# Patient Record
Sex: MALE | Race: WHITE | NOT HISPANIC OR LATINO | Employment: OTHER | URBAN - METROPOLITAN AREA
[De-identification: names, ages, dates, MRNs, and addresses within clinical notes are randomized per-mention and may not be internally consistent; named-entity substitution may affect disease eponyms.]

---

## 2017-02-02 ENCOUNTER — HISTORICAL (OUTPATIENT)
Dept: ADMINISTRATIVE | Facility: HOSPITAL | Age: 72
End: 2017-02-02

## 2017-02-10 LAB — ISTAT CREATININE: 0.6

## 2017-02-17 LAB
BASOPHILS NFR BLD: 0.1 K/UL (ref 0–0.2)
BASOPHILS NFR BLD: 0.9 %
BUN SERPL-MCNC: 13 MG/DL (ref 8–20)
CALCIUM SERPL-MCNC: 9.5 MG/DL (ref 7.7–10.4)
CHLORIDE: 102 MMOL/L (ref 98–110)
CO2 SERPL-SCNC: 31.8 MMOL/L (ref 22.8–31.6)
CREATININE: 0.82 MG/DL (ref 0.6–1.4)
EOSINOPHIL NFR BLD: 0.1 K/UL (ref 0–0.7)
EOSINOPHIL NFR BLD: 1.4 %
ERYTHROCYTE [DISTWIDTH] IN BLOOD BY AUTOMATED COUNT: 13.6 % (ref 11.7–14.9)
GLUCOSE: 132 MG/DL (ref 70–99)
GRAN #: 8 K/UL (ref 1.4–6.5)
GRAN%: 79.6 %
HCT VFR BLD AUTO: 41.4 % (ref 39–55)
HGB BLD-MCNC: 14.7 G/DL (ref 14–16)
IMMATURE GRANS (ABS): 0 K/UL (ref 0–1)
IMMATURE GRANULOCYTES: 0.4 %
LYMPH #: 1.2 K/UL (ref 1.2–3.4)
LYMPH%: 11.7 %
MCH RBC QN AUTO: 32.7 PG (ref 25–35)
MCHC RBC AUTO-ENTMCNC: 35.5 G/DL (ref 31–36)
MCV RBC AUTO: 92 FL (ref 80–100)
MONO #: 0.6 K/UL (ref 0.1–0.6)
MONO%: 6 %
NUCLEATED RBCS: 0 %
PLATELET # BLD AUTO: 246 K/UL (ref 140–440)
PMV BLD AUTO: 11 FL (ref 8.8–12.7)
POTASSIUM SERPL-SCNC: 4.9 MMOL/L (ref 3.5–5)
RBC # BLD AUTO: 4.5 M/UL (ref 4.3–5.9)
SODIUM: 141 MMOL/L (ref 134–144)
WBC # BLD AUTO: 10 K/UL (ref 5–10)

## 2017-07-25 RX ORDER — AMLODIPINE AND VALSARTAN 5; 320 MG/1; MG/1
TABLET ORAL
Qty: 90 TABLET | Refills: 0 | Status: SHIPPED | OUTPATIENT
Start: 2017-07-25 | End: 2017-10-23 | Stop reason: SDUPTHER

## 2017-08-10 RX ORDER — ATENOLOL 50 MG/1
TABLET ORAL
Qty: 90 TABLET | Refills: 0 | Status: SHIPPED | OUTPATIENT
Start: 2017-08-10 | End: 2017-11-08 | Stop reason: SDUPTHER

## 2017-09-19 RX ORDER — PRAVASTATIN SODIUM 40 MG/1
TABLET ORAL
Qty: 90 TABLET | Refills: 0 | Status: SHIPPED | OUTPATIENT
Start: 2017-09-19 | End: 2017-11-20 | Stop reason: SDUPTHER

## 2017-10-23 RX ORDER — AMLODIPINE AND VALSARTAN 5; 320 MG/1; MG/1
TABLET ORAL
Qty: 90 TABLET | Refills: 0 | Status: SHIPPED | OUTPATIENT
Start: 2017-10-23 | End: 2017-11-20 | Stop reason: SDUPTHER

## 2017-10-25 ENCOUNTER — TELEPHONE (OUTPATIENT)
Dept: FAMILY MEDICINE | Facility: CLINIC | Age: 72
End: 2017-10-25

## 2017-10-25 DIAGNOSIS — E78.5 HYPERLIPIDEMIA, UNSPECIFIED HYPERLIPIDEMIA TYPE: Primary | ICD-10-CM

## 2017-10-25 NOTE — TELEPHONE ENCOUNTER
----- Message from Nasrin Blancas sent at 10/25/2017 10:35 AM CDT -----  Contact: patient  anam for 6 mo check on 11-20. Needs lab orders sent to [facility] by Javi Pace.

## 2017-11-08 RX ORDER — ATENOLOL 50 MG/1
TABLET ORAL
Qty: 90 TABLET | Refills: 0 | Status: SHIPPED | OUTPATIENT
Start: 2017-11-08 | End: 2017-11-20 | Stop reason: SDUPTHER

## 2017-11-13 ENCOUNTER — TELEPHONE (OUTPATIENT)
Dept: FAMILY MEDICINE | Facility: CLINIC | Age: 72
End: 2017-11-13

## 2017-11-13 DIAGNOSIS — E78.5 HYPERLIPIDEMIA, UNSPECIFIED HYPERLIPIDEMIA TYPE: Primary | ICD-10-CM

## 2017-11-16 LAB
ALBUMIN SERPL-MCNC: 4.5 G/DL (ref 3.5–4.8)
ALBUMIN/GLOB SERPL: 2.5 {RATIO} (ref 1.2–2.2)
ALP SERPL-CCNC: 57 IU/L (ref 39–117)
ALT SERPL-CCNC: 13 IU/L (ref 0–44)
AST SERPL-CCNC: 21 IU/L (ref 0–40)
BILIRUB SERPL-MCNC: 0.8 MG/DL (ref 0–1.2)
BUN SERPL-MCNC: 9 MG/DL (ref 8–27)
BUN/CREAT SERPL: 13 (ref 10–24)
CALCIUM SERPL-MCNC: 9.5 MG/DL (ref 8.6–10.2)
CHLORIDE SERPL-SCNC: 105 MMOL/L (ref 96–106)
CHOLEST SERPL-MCNC: 148 MG/DL (ref 100–199)
CO2 SERPL-SCNC: 25 MMOL/L (ref 18–29)
CREAT SERPL-MCNC: 0.67 MG/DL (ref 0.76–1.27)
GFR SERPLBLD CREATININE-BSD FMLA CKD-EPI: 110 ML/MIN/1.73
GFR SERPLBLD CREATININE-BSD FMLA CKD-EPI: 95 ML/MIN/1.73
GLOBULIN SER CALC-MCNC: 1.8 G/DL (ref 1.5–4.5)
GLUCOSE SERPL-MCNC: 117 MG/DL (ref 65–99)
HDLC SERPL-MCNC: 73 MG/DL
LDLC SERPL CALC-MCNC: 61 MG/DL (ref 0–99)
POTASSIUM SERPL-SCNC: 4.9 MMOL/L (ref 3.5–5.2)
PROT SERPL-MCNC: 6.3 G/DL (ref 6–8.5)
SODIUM SERPL-SCNC: 145 MMOL/L (ref 134–144)
TRIGL SERPL-MCNC: 68 MG/DL (ref 0–149)
VLDLC SERPL CALC-MCNC: 14 MG/DL (ref 5–40)

## 2017-11-20 ENCOUNTER — OFFICE VISIT (OUTPATIENT)
Dept: FAMILY MEDICINE | Facility: CLINIC | Age: 72
End: 2017-11-20
Payer: MEDICARE

## 2017-11-20 VITALS
SYSTOLIC BLOOD PRESSURE: 110 MMHG | BODY MASS INDEX: 22.82 KG/M2 | HEART RATE: 64 BPM | OXYGEN SATURATION: 99 % | DIASTOLIC BLOOD PRESSURE: 64 MMHG | HEIGHT: 65 IN | WEIGHT: 137 LBS

## 2017-11-20 DIAGNOSIS — F41.9 CHRONIC ANXIETY: ICD-10-CM

## 2017-11-20 DIAGNOSIS — E78.01 FAMILIAL HYPERCHOLESTEROLEMIA: ICD-10-CM

## 2017-11-20 DIAGNOSIS — I10 ESSENTIAL HYPERTENSION: ICD-10-CM

## 2017-11-20 DIAGNOSIS — R41.3 MEMORY LOSS, LONG TERM: ICD-10-CM

## 2017-11-20 DIAGNOSIS — I34.1 MVP (MITRAL VALVE PROLAPSE): ICD-10-CM

## 2017-11-20 PROBLEM — E78.5 HYPERLIPIDEMIA: Status: ACTIVE | Noted: 2017-11-20

## 2017-11-20 PROCEDURE — 99214 OFFICE O/P EST MOD 30 MIN: CPT | Mod: ,,, | Performed by: FAMILY MEDICINE

## 2017-11-20 RX ORDER — PRAVASTATIN SODIUM 40 MG/1
40 TABLET ORAL DAILY
Qty: 90 TABLET | Refills: 1 | Status: SHIPPED | OUTPATIENT
Start: 2017-11-20 | End: 2018-05-19 | Stop reason: SDUPTHER

## 2017-11-20 RX ORDER — ATENOLOL 50 MG/1
50 TABLET ORAL DAILY
Qty: 90 TABLET | Refills: 1 | Status: SHIPPED | OUTPATIENT
Start: 2017-11-20 | End: 2018-09-18 | Stop reason: SDUPTHER

## 2017-11-20 RX ORDER — ALPRAZOLAM 0.5 MG/1
0.5 TABLET ORAL 3 TIMES DAILY
Qty: 60 TABLET | Refills: 0 | Status: SHIPPED | OUTPATIENT
Start: 2017-11-20 | End: 2020-04-21 | Stop reason: SDUPTHER

## 2017-11-20 RX ORDER — AMLODIPINE AND VALSARTAN 5; 320 MG/1; MG/1
1 TABLET ORAL DAILY
Qty: 90 TABLET | Refills: 1 | Status: SHIPPED | OUTPATIENT
Start: 2017-11-20 | End: 2018-06-20 | Stop reason: SDUPTHER

## 2017-11-20 NOTE — PROGRESS NOTES
Subjective:       Patient ID: Jonathon Pantoja is a 72 y.o. male.    Chief Complaint: Hyperlipidemia (discuss lab results); Hypertension; Anxiety; and Tremors (hands and legs)    Wife with him and noticing progressive loss of longterm memory, resting tremor and muscle weakness. Family history of alzheimer, no parkinsons      Hyperlipidemia   This is a chronic problem. The current episode started more than 1 year ago. The problem is controlled. Recent lipid tests were reviewed and are low. He has no history of chronic renal disease or diabetes. There are no known factors aggravating his hyperlipidemia. Pertinent negatives include no chest pain, myalgias or shortness of breath. Current antihyperlipidemic treatment includes statins. The current treatment provides significant improvement of lipids. There are no compliance problems.    Hypertension   Associated symptoms include anxiety. Pertinent negatives include no chest pain, headaches or shortness of breath. There is no history of chronic renal disease.   Anxiety   Symptoms include nervous/anxious behavior. Patient reports no chest pain, dizziness, nausea, shortness of breath or suicidal ideas.         Review of Systems   Constitutional: Negative for activity change, appetite change and fever.   HENT: Negative for congestion and sore throat.    Eyes: Negative for visual disturbance.   Respiratory: Negative for cough, chest tightness and shortness of breath.    Cardiovascular: Negative for chest pain.   Gastrointestinal: Negative for abdominal pain, constipation, diarrhea and nausea.   Genitourinary: Negative for difficulty urinating.   Musculoskeletal: Negative for back pain and myalgias.   Neurological: Positive for tremors and speech difficulty. Negative for dizziness, facial asymmetry and headaches.   Hematological: Negative for adenopathy.   Psychiatric/Behavioral: Negative for dysphoric mood and suicidal ideas. The patient is nervous/anxious.        Past Medical  "History:   Diagnosis Date    Chronic systolic congestive heart failure     Hyperglycemia     Hyperlipidemia     Hypertension     MVP (mitral valve prolapse)       Past Surgical History:   Procedure Laterality Date    HERNIA REPAIR         History reviewed. No pertinent family history.    Social History     Social History    Marital status:      Spouse name: N/A    Number of children: N/A    Years of education: N/A     Social History Main Topics    Smoking status: Former Smoker    Smokeless tobacco: Never Used    Alcohol use Yes    Drug use: No    Sexual activity: Not Asked     Other Topics Concern    None     Social History Narrative    None       Current Outpatient Prescriptions   Medication Sig Dispense Refill    ALPRAZolam (XANAX) 0.5 MG tablet Take 1 tablet (0.5 mg total) by mouth 3 (three) times daily. 60 tablet 0    amlodipine-valsartan (EXFORGE) 5-320 mg per tablet Take 1 tablet by mouth once daily. 90 tablet 1    atenolol (TENORMIN) 50 MG tablet Take 1 tablet (50 mg total) by mouth once daily. 90 tablet 1    pravastatin (PRAVACHOL) 40 MG tablet Take 1 tablet (40 mg total) by mouth once daily. 90 tablet 1     No current facility-administered medications for this visit.        Review of patient's allergies indicates:  No Known Allergies  Objective:    HPI     Hyperlipidemia    Additional comments: discuss lab results           Tremors    Additional comments: hands and legs       Last edited by Kath Maxwell MA on 11/20/2017  1:18 PM. (History)      Blood pressure 110/64, pulse 64, height 5' 5" (1.651 m), weight 62.1 kg (137 lb), SpO2 99 %. Body mass index is 22.8 kg/m².   Physical Exam   Constitutional: He is oriented to person, place, and time. He appears well-developed and well-nourished.   HENT:   Head: Atraumatic.   Eyes: EOM are normal. Pupils are equal, round, and reactive to light. Right pupil is round. Left pupil is round.   Neck: Normal range of motion. Neck supple. No " thyromegaly present.   Cardiovascular: Normal rate and regular rhythm.    No murmur heard.  Pulmonary/Chest: Effort normal and breath sounds normal. No respiratory distress.   Abdominal: Soft. Bowel sounds are normal. There is no hepatosplenomegaly. There is no tenderness.   Musculoskeletal: Normal range of motion. He exhibits no edema.   Lymphadenopathy:     He has no cervical adenopathy.        Right: No supraclavicular adenopathy present.        Left: No supraclavicular adenopathy present.   Neurological: He is alert and oriented to person, place, and time. He has normal strength. No cranial nerve deficit or sensory deficit. Coordination normal.   Skin: Skin is warm and dry.   Psychiatric: He has a normal mood and affect. His behavior is normal. Judgment and thought content normal. He expresses no suicidal plans.           Assessment:       1. Essential hypertension    2. Familial hypercholesterolemia    3. MVP (mitral valve prolapse)    4. Memory loss, long term    5. Chronic anxiety        Plan:       Jonathon was seen today for hyperlipidemia, hypertension, anxiety and tremors.    Diagnoses and all orders for this visit:    Essential hypertension  -     amlodipine-valsartan (EXFORGE) 5-320 mg per tablet; Take 1 tablet by mouth once daily.    Familial hypercholesterolemia  -     pravastatin (PRAVACHOL) 40 MG tablet; Take 1 tablet (40 mg total) by mouth once daily.    MVP (mitral valve prolapse)  -     atenolol (TENORMIN) 50 MG tablet; Take 1 tablet (50 mg total) by mouth once daily.    Memory loss, long term  -     Ambulatory referral to Neurology    Chronic anxiety  -     ALPRAZolam (XANAX) 0.5 MG tablet; Take 1 tablet (0.5 mg total) by mouth 3 (three) times daily.

## 2018-05-19 DIAGNOSIS — E78.01 FAMILIAL HYPERCHOLESTEROLEMIA: ICD-10-CM

## 2018-05-21 RX ORDER — PRAVASTATIN SODIUM 40 MG/1
TABLET ORAL
Qty: 90 TABLET | Refills: 1 | Status: SHIPPED | OUTPATIENT
Start: 2018-05-21 | End: 2018-09-18 | Stop reason: SDUPTHER

## 2018-06-20 DIAGNOSIS — I10 ESSENTIAL HYPERTENSION: ICD-10-CM

## 2018-06-20 RX ORDER — AMLODIPINE AND VALSARTAN 5; 320 MG/1; MG/1
TABLET ORAL
Qty: 90 TABLET | Refills: 1 | Status: SHIPPED | OUTPATIENT
Start: 2018-06-20 | End: 2018-09-18 | Stop reason: SDUPTHER

## 2018-08-28 ENCOUNTER — TELEPHONE (OUTPATIENT)
Dept: FAMILY MEDICINE | Facility: CLINIC | Age: 73
End: 2018-08-28

## 2018-08-28 DIAGNOSIS — I10 ESSENTIAL HYPERTENSION: ICD-10-CM

## 2018-08-28 DIAGNOSIS — R73.01 IMPAIRED FASTING GLUCOSE: Primary | ICD-10-CM

## 2018-08-28 NOTE — TELEPHONE ENCOUNTER
----- Message from Seven Morales MA sent at 8/28/2018  1:27 PM CDT -----  Regarding: FW: lab orders   Contact: [phone]      ----- Message -----  From: Valeria Santillan  Sent: 8/28/2018  11:51 AM  To: [facility] Staff  Subject: lab orders                                       LAB ORDER REQUEST   YOB: 1945  Appointment Date: 09/18/2018  Appointment Type: 6 month full up htn, cholestrol  Lab: [facility]

## 2018-09-07 LAB
ALBUMIN SERPL-MCNC: 4.7 G/DL (ref 3.5–4.8)
ALBUMIN/GLOB SERPL: 2.6 {RATIO} (ref 1.2–2.2)
ALP SERPL-CCNC: 61 IU/L (ref 39–117)
ALT SERPL-CCNC: 24 IU/L (ref 0–44)
APPEARANCE UR: CLEAR
AST SERPL-CCNC: 21 IU/L (ref 0–40)
BILIRUB SERPL-MCNC: 0.8 MG/DL (ref 0–1.2)
BILIRUB UR QL STRIP: NEGATIVE
BUN SERPL-MCNC: 16 MG/DL (ref 8–27)
BUN/CREAT SERPL: 23 (ref 10–24)
CALCIUM SERPL-MCNC: 9.4 MG/DL (ref 8.6–10.2)
CHLORIDE SERPL-SCNC: 104 MMOL/L (ref 96–106)
CHOLEST SERPL-MCNC: 147 MG/DL (ref 100–199)
CO2 SERPL-SCNC: 24 MMOL/L (ref 20–29)
COLOR UR: YELLOW
CREAT SERPL-MCNC: 0.69 MG/DL (ref 0.76–1.27)
EGFR IF AFRICAN AMERICAN: 109 ML/MIN/1.73
EST. GFR  (NON AFRICAN AMERICAN): 94 ML/MIN/1.73
GLOBULIN SER CALC-MCNC: 1.8 G/DL (ref 1.5–4.5)
GLUCOSE SERPL-MCNC: 108 MG/DL (ref 65–99)
GLUCOSE UR QL: NEGATIVE
HBA1C MFR BLD: 4.9 % (ref 4.8–5.6)
HDLC SERPL-MCNC: 80 MG/DL
HGB UR QL STRIP: NEGATIVE
KETONES UR QL STRIP: NEGATIVE
LDLC SERPL CALC-MCNC: 56 MG/DL (ref 0–99)
LEUKOCYTE ESTERASE UR QL STRIP: NEGATIVE
MICRO URNS: NORMAL
NITRITE UR QL STRIP: NEGATIVE
PH UR STRIP: 5.5 [PH] (ref 5–7.5)
POTASSIUM SERPL-SCNC: 4.3 MMOL/L (ref 3.5–5.2)
PROT SERPL-MCNC: 6.5 G/DL (ref 6–8.5)
PROT UR QL STRIP: NEGATIVE
SODIUM SERPL-SCNC: 145 MMOL/L (ref 134–144)
SP GR UR: 1.02 (ref 1–1.03)
TRIGL SERPL-MCNC: 53 MG/DL (ref 0–149)
UROBILINOGEN UR STRIP-MCNC: 0.2 MG/DL (ref 0.2–1)
VLDLC SERPL CALC-MCNC: 11 MG/DL (ref 5–40)

## 2018-09-18 ENCOUNTER — OFFICE VISIT (OUTPATIENT)
Dept: FAMILY MEDICINE | Facility: CLINIC | Age: 73
End: 2018-09-18
Payer: MEDICARE

## 2018-09-18 VITALS
TEMPERATURE: 98 F | HEIGHT: 65 IN | HEART RATE: 62 BPM | OXYGEN SATURATION: 96 % | DIASTOLIC BLOOD PRESSURE: 68 MMHG | BODY MASS INDEX: 20.99 KG/M2 | SYSTOLIC BLOOD PRESSURE: 110 MMHG | WEIGHT: 126 LBS

## 2018-09-18 DIAGNOSIS — R06.09 DYSPNEA ON EXERTION: ICD-10-CM

## 2018-09-18 DIAGNOSIS — I10 ESSENTIAL HYPERTENSION: Primary | ICD-10-CM

## 2018-09-18 DIAGNOSIS — R35.1 BENIGN PROSTATIC HYPERPLASIA WITH NOCTURIA: ICD-10-CM

## 2018-09-18 DIAGNOSIS — R63.4 WEIGHT LOSS, UNINTENTIONAL: ICD-10-CM

## 2018-09-18 DIAGNOSIS — R41.3 MEMORY LOSS, LONG TERM: ICD-10-CM

## 2018-09-18 DIAGNOSIS — I34.1 MVP (MITRAL VALVE PROLAPSE): ICD-10-CM

## 2018-09-18 DIAGNOSIS — N40.1 BENIGN PROSTATIC HYPERPLASIA WITH NOCTURIA: ICD-10-CM

## 2018-09-18 DIAGNOSIS — E78.01 FAMILIAL HYPERCHOLESTEROLEMIA: ICD-10-CM

## 2018-09-18 PROBLEM — I50.22 CHRONIC SYSTOLIC HEART FAILURE: Status: ACTIVE | Noted: 2018-09-18

## 2018-09-18 PROBLEM — K40.20 BILATERAL INGUINAL HERNIA WITHOUT OBSTRUCTION OR GANGRENE: Status: RESOLVED | Noted: 2018-09-18 | Resolved: 2018-09-18

## 2018-09-18 PROBLEM — K40.20 BILATERAL INGUINAL HERNIA WITHOUT OBSTRUCTION OR GANGRENE: Status: ACTIVE | Noted: 2018-09-18

## 2018-09-18 PROBLEM — R73.9 HYPERGLYCEMIA: Status: ACTIVE | Noted: 2018-09-18

## 2018-09-18 PROCEDURE — 99214 OFFICE O/P EST MOD 30 MIN: CPT | Mod: ,,, | Performed by: INTERNAL MEDICINE

## 2018-09-18 RX ORDER — PRAVASTATIN SODIUM 40 MG/1
40 TABLET ORAL DAILY
Qty: 90 TABLET | Refills: 1 | Status: SHIPPED | OUTPATIENT
Start: 2018-09-18 | End: 2019-01-29 | Stop reason: SDUPTHER

## 2018-09-18 RX ORDER — DONEPEZIL HYDROCHLORIDE 10 MG/1
1 TABLET, FILM COATED ORAL DAILY
COMMUNITY
Start: 2018-09-10

## 2018-09-18 RX ORDER — MULTIVITAMIN
1 TABLET ORAL DAILY
COMMUNITY

## 2018-09-18 RX ORDER — CARBIDOPA AND LEVODOPA 25; 250 MG/1; MG/1
1 TABLET ORAL 4 TIMES DAILY
COMMUNITY
Start: 2018-08-07

## 2018-09-18 RX ORDER — ATENOLOL 50 MG/1
50 TABLET ORAL DAILY
Qty: 90 TABLET | Refills: 1 | Status: SHIPPED | OUTPATIENT
Start: 2018-09-18 | End: 2019-01-29 | Stop reason: SDUPTHER

## 2018-09-18 RX ORDER — ASPIRIN 81 MG/1
1 TABLET ORAL DAILY
COMMUNITY

## 2018-09-18 RX ORDER — AMLODIPINE AND VALSARTAN 5; 320 MG/1; MG/1
1 TABLET ORAL DAILY
Qty: 90 TABLET | Refills: 1 | Status: SHIPPED | OUTPATIENT
Start: 2018-09-18 | End: 2018-12-18 | Stop reason: HOSPADM

## 2018-09-18 NOTE — PROGRESS NOTES
Subjective:       Patient ID: Jonathon Pantoja is a 72 y.o. male.    Chief Complaint: Hypertension (continuity of care and med refills) and Hyperlipidemia    Here to establish care with me; previously seeing Dr. Walters who is cutting back on his schedule.   Reports a h/o dementia which they feel has actually improved some on medications; followed by Neuro      Hypertension   This is a chronic problem. The problem is controlled. Associated symptoms include shortness of breath (especially when out in the heat). Pertinent negatives include no chest pain, headaches, neck pain, orthopnea, palpitations or peripheral edema. Past treatments include beta blockers, calcium channel blockers and angiotensin blockers.     Review of Systems   Constitutional: Positive for chills and unexpected weight change (weight loss of 9 pounds since last visit and 30+ pounds in last couple of years). Negative for fatigue and fever.   HENT: Positive for rhinorrhea. Negative for congestion, hearing loss, postnasal drip, trouble swallowing and voice change.    Eyes: Negative for photophobia and visual disturbance.   Respiratory: Positive for shortness of breath (especially when out in the heat). Negative for apnea, cough, choking, chest tightness and wheezing.    Cardiovascular: Negative for chest pain, palpitations, orthopnea and leg swelling.   Gastrointestinal: Positive for constipation. Negative for abdominal pain, blood in stool, diarrhea, nausea, rectal pain and vomiting.   Endocrine: Positive for cold intolerance. Negative for heat intolerance, polydipsia and polyuria.   Genitourinary: Negative for decreased urine volume, difficulty urinating, discharge, dysuria, flank pain, frequency, genital sores, hematuria, testicular pain and urgency.        Urine incontinence   Musculoskeletal: Negative for arthralgias, back pain, gait problem, joint swelling, myalgias and neck pain.   Skin: Positive for color change. Negative for rash and wound.    Allergic/Immunologic: Negative for environmental allergies and food allergies.   Neurological: Positive for tremors and speech difficulty. Negative for dizziness, seizures, syncope, facial asymmetry, weakness, light-headedness, numbness and headaches.   Hematological: Negative for adenopathy. Bruises/bleeds easily.   Psychiatric/Behavioral: Positive for confusion. Negative for hallucinations, sleep disturbance and suicidal ideas. The patient is nervous/anxious.        Past Medical History:   Diagnosis Date    Chronic systolic congestive heart failure     Hyperglycemia     Hyperlipidemia     Hypertension     MVP (mitral valve prolapse)       Past Surgical History:   Procedure Laterality Date    HERNIA REPAIR Bilateral        Family History   Problem Relation Age of Onset    Alzheimer's disease Mother     No Known Problems Father        Social History     Socioeconomic History    Marital status:      Spouse name: None    Number of children: None    Years of education: None    Highest education level: None   Social Needs    Financial resource strain: None    Food insecurity - worry: None    Food insecurity - inability: None    Transportation needs - medical: None    Transportation needs - non-medical: None   Occupational History    Occupation: retired   Tobacco Use    Smoking status: Former Smoker    Smokeless tobacco: Never Used   Substance and Sexual Activity    Alcohol use: Yes     Comment: occasional    Drug use: No    Sexual activity: No   Other Topics Concern    None   Social History Narrative    Live with wife       Current Outpatient Medications   Medication Sig Dispense Refill    amlodipine-valsartan (EXFORGE) 5-320 mg per tablet Take 1 tablet by mouth once daily. 90 tablet 1    aspirin (ECOTRIN) 81 MG EC tablet Take 1 tablet by mouth once daily.      atenolol (TENORMIN) 50 MG tablet Take 1 tablet (50 mg total) by mouth once daily. 90 tablet 1    carbidopa-levodopa   "mg (SINEMET)  mg per tablet Take 1 tablet by mouth 3 (three) times daily.      donepezil (ARICEPT) 10 MG tablet Take 1 tablet by mouth once daily.      multivitamin (THERAGRAN) per tablet Take 1 tablet by mouth once daily.      pravastatin (PRAVACHOL) 40 MG tablet Take 1 tablet (40 mg total) by mouth once daily. 90 tablet 1    ALPRAZolam (XANAX) 0.5 MG tablet Take 1 tablet (0.5 mg total) by mouth 3 (three) times daily. 60 tablet 0     No current facility-administered medications for this visit.        Review of patient's allergies indicates:  No Known Allergies  Objective:    HPI     Hypertension      Additional comments: continuity of care and med refills          Last edited by Seven Morales MA on 9/18/2018 11:19 AM. (History)      Blood pressure 110/68, pulse 62, temperature 97.8 °F (36.6 °C), temperature source Temporal, height 5' 5" (1.651 m), weight 57.2 kg (126 lb), SpO2 96 %. Body mass index is 20.97 kg/m².   Physical Exam      Telephone on 08/28/2018   Component Date Value Ref Range Status    Specific Gravity, UA 09/06/2018 1.023  1.005 - 1.030 Final    pH, UA 09/06/2018 5.5  5.0 - 7.5 Final    Color, UA 09/06/2018 Yellow  Yellow Final    Clarity, UA 09/06/2018 Clear  Clear Final    Leukocytes, UA 09/06/2018 Negative  Negative Final    Protein, UA 09/06/2018 Negative  Negative/Trace Final    Glucose, UA 09/06/2018 Negative  Negative Final    Ketones, UA 09/06/2018 Negative  Negative Final    Occult Blood UA 09/06/2018 Negative  Negative Final    Bilirubin, UA 09/06/2018 Negative  Negative Final    Urobilinogen, UA 09/06/2018 0.2  0.2 - 1.0 mg/dL Final    Nitrite, UA 09/06/2018 Negative  Negative Final    Microscopic Examination 09/06/2018 Comment   Final    Microscopic not indicated and not performed.    Cholesterol 09/06/2018 147  100 - 199 mg/dL Final    Triglycerides 09/06/2018 53  0 - 149 mg/dL Final    HDL 09/06/2018 80  >39 mg/dL Final    VLDL Cholesterol Noe 09/06/2018 11 "  5 - 40 mg/dL Final    LDL Calculated 09/06/2018 56  0 - 99 mg/dL Final    Glucose 09/06/2018 108* 65 - 99 mg/dL Final    BUN, Bld 09/06/2018 16  8 - 27 mg/dL Final    Creatinine 09/06/2018 0.69* 0.76 - 1.27 mg/dL Final    eGFR if non African American 09/06/2018 94  >59 mL/min/1.73 Final    eGFR if African American 09/06/2018 109  >59 mL/min/1.73 Final    BUN/Creatinine Ratio 09/06/2018 23  10 - 24 Final    Sodium 09/06/2018 145* 134 - 144 mmol/L Final    Potassium 09/06/2018 4.3  3.5 - 5.2 mmol/L Final    Chloride 09/06/2018 104  96 - 106 mmol/L Final    CO2 09/06/2018 24  20 - 29 mmol/L Final    Calcium 09/06/2018 9.4  8.6 - 10.2 mg/dL Final    Total Protein 09/06/2018 6.5  6.0 - 8.5 g/dL Final    Albumin 09/06/2018 4.7  3.5 - 4.8 g/dL Final    Globulin, Total 09/06/2018 1.8  1.5 - 4.5 g/dL Final    Albumin/Globulin Ratio 09/06/2018 2.6* 1.2 - 2.2 Final    Total Bilirubin 09/06/2018 0.8  0.0 - 1.2 mg/dL Final    Alkaline Phosphatase 09/06/2018 61  39 - 117 IU/L Final    AST 09/06/2018 21  0 - 40 IU/L Final    ALT 09/06/2018 24  0 - 44 IU/L Final    Hemoglobin A1C 09/06/2018 4.9  4.8 - 5.6 % Final    Comment:          Prediabetes: 5.7 - 6.4           Diabetes: >6.4           Glycemic control for adults with diabetes: <7.0     ]  Assessment:       1. Essential hypertension    2. Dyspnea on exertion    3. Familial hypercholesterolemia    4. Weight loss, unintentional    5. MVP (mitral valve prolapse)    6. Memory loss, long term    7. Benign prostatic hyperplasia with nocturia         Plan:       Jonathon was seen today for hypertension and hyperlipidemia.    Diagnoses and all orders for this visit:    Essential hypertension  -     amlodipine-valsartan (EXFORGE) 5-320 mg per tablet; Take 1 tablet by mouth once daily.    Dyspnea on exertion  -     CBC auto differential; Future  -     X-Ray Chest PA And Lateral; Future  -     Brain natriuretic peptide; Future  -     CBC auto differential  -     Brain  natriuretic peptide    Familial hypercholesterolemia  -     pravastatin (PRAVACHOL) 40 MG tablet; Take 1 tablet (40 mg total) by mouth once daily.    Weight loss, unintentional  Comments:  May be r/t less intake d/t dementia but needs evaluation.   Orders:  -     TSH; Future  -     CBC auto differential; Future  -     Prostate Specific Antigen, Diagnostic; Future  -     X-Ray Chest PA And Lateral; Future  -     TSH  -     CBC auto differential  -     Prostate Specific Antigen, Diagnostic    MVP (mitral valve prolapse)  -     atenolol (TENORMIN) 50 MG tablet; Take 1 tablet (50 mg total) by mouth once daily.    Memory loss, long term  Comments:  Followed by Neuro      Benign prostatic hyperplasia with nocturia   -     Prostate Specific Antigen, Diagnostic; Future  -     Prostate Specific Antigen, Diagnostic

## 2018-09-20 LAB
BASOPHILS # BLD AUTO: 0.1 X10E3/UL (ref 0–0.2)
BASOPHILS NFR BLD AUTO: 1 %
BNP SERPL-MCNC: 47.7 PG/ML (ref 0–100)
EOSINOPHIL # BLD AUTO: 0.1 X10E3/UL (ref 0–0.4)
EOSINOPHIL NFR BLD AUTO: 2 %
ERYTHROCYTE [DISTWIDTH] IN BLOOD BY AUTOMATED COUNT: 14.5 % (ref 12.3–15.4)
HCT VFR BLD AUTO: 41.9 % (ref 37.5–51)
HGB BLD-MCNC: 14 G/DL (ref 13–17.7)
IMM GRANULOCYTES # BLD: 0 X10E3/UL (ref 0–0.1)
IMM GRANULOCYTES NFR BLD: 0 %
LYMPHOCYTES # BLD AUTO: 1.2 X10E3/UL (ref 0.7–3.1)
LYMPHOCYTES NFR BLD AUTO: 21 %
MCH RBC QN AUTO: 31.7 PG (ref 26.6–33)
MCHC RBC AUTO-ENTMCNC: 33.4 G/DL (ref 31.5–35.7)
MCV RBC AUTO: 95 FL (ref 79–97)
MONOCYTES # BLD AUTO: 0.4 X10E3/UL (ref 0.1–0.9)
MONOCYTES NFR BLD AUTO: 8 %
NEUTROPHILS # BLD AUTO: 3.9 X10E3/UL (ref 1.4–7)
NEUTROPHILS NFR BLD AUTO: 68 %
PLATELET # BLD AUTO: 219 X10E3/UL (ref 150–379)
PSA SERPL-MCNC: 1.9 NG/ML (ref 0–4)
RBC # BLD AUTO: 4.41 X10E6/UL (ref 4.14–5.8)
TSH SERPL DL<=0.005 MIU/L-ACNC: 2.14 UIU/ML (ref 0.45–4.5)
WBC # BLD AUTO: 5.7 X10E3/UL (ref 3.4–10.8)

## 2018-12-18 ENCOUNTER — OFFICE VISIT (OUTPATIENT)
Dept: FAMILY MEDICINE | Facility: CLINIC | Age: 73
End: 2018-12-18
Payer: MEDICARE

## 2018-12-18 VITALS
HEART RATE: 65 BPM | OXYGEN SATURATION: 98 % | HEIGHT: 65 IN | TEMPERATURE: 98 F | SYSTOLIC BLOOD PRESSURE: 100 MMHG | BODY MASS INDEX: 21.16 KG/M2 | WEIGHT: 127 LBS | DIASTOLIC BLOOD PRESSURE: 60 MMHG

## 2018-12-18 DIAGNOSIS — Z23 NEED FOR PROPHYLACTIC VACCINATION AGAINST STREPTOCOCCUS PNEUMONIAE (PNEUMOCOCCUS): ICD-10-CM

## 2018-12-18 DIAGNOSIS — I10 ESSENTIAL HYPERTENSION: Primary | ICD-10-CM

## 2018-12-18 PROBLEM — G20.A1 PARKINSON'S DISEASE: Status: ACTIVE | Noted: 2018-05-01

## 2018-12-18 PROBLEM — F41.9 ANXIETY DISORDER: Status: ACTIVE | Noted: 2017-11-29

## 2018-12-18 PROCEDURE — 90732 PPSV23 VACC 2 YRS+ SUBQ/IM: CPT | Mod: ,,, | Performed by: INTERNAL MEDICINE

## 2018-12-18 PROCEDURE — G0009 ADMIN PNEUMOCOCCAL VACCINE: HCPCS | Mod: ,,, | Performed by: INTERNAL MEDICINE

## 2018-12-18 PROCEDURE — 99213 OFFICE O/P EST LOW 20 MIN: CPT | Mod: 25,,, | Performed by: INTERNAL MEDICINE

## 2018-12-18 RX ORDER — IRBESARTAN 300 MG/1
300 TABLET ORAL NIGHTLY
Qty: 90 TABLET | Refills: 1 | Status: SHIPPED | OUTPATIENT
Start: 2018-12-18 | End: 2019-03-12 | Stop reason: SDUPTHER

## 2018-12-18 NOTE — PROGRESS NOTES
Subjective:       Patient ID: Jonathon Pantoja is a 73 y.o. male.    Chief Complaint: Hypertension (continuity of care) and Anxiety    Here for routine follow up and med refills.  His wife reports that he has been eating better and his weight seems to have stabilized.  Memory seems to be stable also.        Hypertension   This is a chronic problem. The problem is controlled. Associated symptoms include shortness of breath. Pertinent negatives include no chest pain, headaches, neck pain, palpitations or peripheral edema. Past treatments include calcium channel blockers, angiotensin blockers and beta blockers. There are no compliance problems (recently got notice of recall on exforge but has continued to take it).      Review of Systems   Constitutional: Positive for fatigue. Negative for chills, fever and unexpected weight change (weight stabilized).   HENT: Positive for rhinorrhea. Negative for congestion, hearing loss, postnasal drip, trouble swallowing and voice change.    Eyes: Negative for photophobia and visual disturbance.   Respiratory: Positive for shortness of breath. Negative for apnea, cough, choking, chest tightness and wheezing.    Cardiovascular: Negative for chest pain, palpitations and leg swelling.   Gastrointestinal: Positive for constipation. Negative for abdominal pain, blood in stool, diarrhea, nausea, rectal pain and vomiting.   Endocrine: Negative for cold intolerance, heat intolerance, polydipsia and polyuria.   Genitourinary: Negative for decreased urine volume, difficulty urinating, discharge, dysuria, flank pain, frequency, genital sores, hematuria, testicular pain and urgency.   Musculoskeletal: Negative for arthralgias, back pain, gait problem, joint swelling, myalgias and neck pain.   Skin: Negative for color change, rash and wound.   Allergic/Immunologic: Negative for environmental allergies and food allergies.   Neurological: Positive for tremors, speech difficulty, weakness and  light-headedness (sometimes when stands quickly). Negative for dizziness, seizures, syncope, facial asymmetry, numbness and headaches.   Hematological: Negative for adenopathy. Bruises/bleeds easily.   Psychiatric/Behavioral: Positive for confusion and sleep disturbance. Negative for hallucinations and suicidal ideas (nightmares). The patient is nervous/anxious.        Past Medical History:   Diagnosis Date    Chronic systolic congestive heart failure     Hyperglycemia     Hyperlipidemia     Hypertension     MVP (mitral valve prolapse)       Past Surgical History:   Procedure Laterality Date    HERNIA REPAIR Bilateral        Family History   Problem Relation Age of Onset    Alzheimer's disease Mother     No Known Problems Father        Social History     Socioeconomic History    Marital status:      Spouse name: None    Number of children: None    Years of education: None    Highest education level: None   Social Needs    Financial resource strain: None    Food insecurity - worry: None    Food insecurity - inability: None    Transportation needs - medical: None    Transportation needs - non-medical: None   Occupational History    Occupation: retired   Tobacco Use    Smoking status: Former Smoker    Smokeless tobacco: Never Used   Substance and Sexual Activity    Alcohol use: Yes     Comment: occasional    Drug use: No    Sexual activity: No   Other Topics Concern    None   Social History Narrative    Live with wife       Current Outpatient Medications   Medication Sig Dispense Refill    aspirin (ECOTRIN) 81 MG EC tablet Take 1 tablet by mouth once daily.      atenolol (TENORMIN) 50 MG tablet Take 1 tablet (50 mg total) by mouth once daily. 90 tablet 1    carbidopa-levodopa  mg (SINEMET)  mg per tablet Take 1 tablet by mouth 4 (four) times daily.       donepezil (ARICEPT) 10 MG tablet Take 1 tablet by mouth once daily.      multivitamin (THERAGRAN) per tablet Take 1  "tablet by mouth once daily.      pravastatin (PRAVACHOL) 40 MG tablet Take 1 tablet (40 mg total) by mouth once daily. 90 tablet 1    ALPRAZolam (XANAX) 0.5 MG tablet Take 1 tablet (0.5 mg total) by mouth 3 (three) times daily. 60 tablet 0    irbesartan (AVAPRO) 300 MG tablet Take 1 tablet (300 mg total) by mouth every evening. 90 tablet 1     No current facility-administered medications for this visit.        Review of patient's allergies indicates:  No Known Allergies  Objective:    HPI     Hypertension      Additional comments: continuity of care          Last edited by Seven Morales MA on 12/18/2018  1:02 PM. (History)      Blood pressure 100/60, pulse 65, temperature 98 °F (36.7 °C), temperature source Temporal, height 5' 5" (1.651 m), weight 57.6 kg (127 lb), SpO2 98 %. Body mass index is 21.13 kg/m².   Physical Exam   Constitutional: He appears well-developed. No distress.   HENT:   Nose: Nose normal.   Mouth/Throat: Oropharynx is clear and moist.   Eyes: Conjunctivae are normal. Right eye exhibits no discharge. Left eye exhibits no discharge. No scleral icterus.   Neck: Carotid bruit is not present.   Cardiovascular: Normal rate, regular rhythm and normal heart sounds.   No murmur heard.  Pulmonary/Chest: Effort normal and breath sounds normal. No respiratory distress. He has no decreased breath sounds. He has no wheezes. He has no rhonchi. He has no rales.   Abdominal: Soft. He exhibits no distension. There is no tenderness. There is no rebound and no guarding.   Musculoskeletal: He exhibits no edema.   Neurological: He is alert. He displays no tremor.   Skin: Skin is warm and dry.   Psychiatric: He has a normal mood and affect. His speech is normal.   Nursing note and vitals reviewed.        No visits with results within 3 Month(s) from this visit.   Latest known visit with results is:   Office Visit on 09/18/2018   Component Date Value Ref Range Status    TSH 09/19/2018 2.140  0.450 - 4.500 uIU/mL " Final    WBC 09/19/2018 5.7  3.4 - 10.8 x10E3/uL Final    RBC 09/19/2018 4.41  4.14 - 5.80 x10E6/uL Final    Hemoglobin 09/19/2018 14.0  13.0 - 17.7 g/dL Final    Hematocrit 09/19/2018 41.9  37.5 - 51.0 % Final    MCV 09/19/2018 95  79 - 97 fL Final    MCH 09/19/2018 31.7  26.6 - 33.0 pg Final    MCHC 09/19/2018 33.4  31.5 - 35.7 g/dL Final    RDW 09/19/2018 14.5  12.3 - 15.4 % Final    Platelets 09/19/2018 219  150 - 379 x10E3/uL Final    Neutrophils 09/19/2018 68  Not Estab. % Final    Lymph% 09/19/2018 21  Not Estab. % Final    Mono% 09/19/2018 8  Not Estab. % Final    Eosinophil% 09/19/2018 2  Not Estab. % Final    Basophil% 09/19/2018 1  Not Estab. % Final    Neutrophils Absolute 09/19/2018 3.9  1.4 - 7.0 x10E3/uL Final    Lymph # 09/19/2018 1.2  0.7 - 3.1 x10E3/uL Final    Mono # 09/19/2018 0.4  0.1 - 0.9 x10E3/uL Final    Eos # 09/19/2018 0.1  0.0 - 0.4 x10E3/uL Final    Baso # 09/19/2018 0.1  0.0 - 0.2 x10E3/uL Final    Immature Granulocytes 09/19/2018 0  Not Estab. % Final    Immature Grans (Abs) 09/19/2018 0.0  0.0 - 0.1 x10E3/uL Final    BNP 09/19/2018 47.7  0.0 - 100.0 pg/mL Final    PSA - [facility] 09/19/2018 1.9  0.0 - 4.0 ng/mL Final    Comment: Roche ECLIA methodology.  According to the [employer] Association, Serum PSA should  decrease and remain at undetectable levels after radical  prostatectomy. The AUA defines biochemical recurrence as an initial  PSA value 0.2 ng/mL or greater followed by a subsequent confirmatory  PSA value 0.2 ng/mL or greater.  Values obtained with different assay methods or kits cannot be used  interchangeably. Results cannot be interpreted as absolute evidence  of the presence or absence of malignant disease.     ]  Assessment:       1. Essential hypertension    2. Need for prophylactic vaccination against Streptococcus pneumoniae (pneumococcus)        Plan:       Jonathon was seen today for hypertension and anxiety.    Diagnoses and all orders  for this visit:    Essential hypertension  Comments:  Will switch to a different ARB and drop the amlodipine for now.  BP in the 120s/70s may improve fatigue and occasional lightheadedness.  Orders:  -     irbesartan (AVAPRO) 300 MG tablet; Take 1 tablet (300 mg total) by mouth every evening.    Need for prophylactic vaccination against Streptococcus pneumoniae (pneumococcus)  -     Pneumococcal Polysaccharide Vaccine (23 Valent) (SQ/IM)

## 2019-01-29 ENCOUNTER — OFFICE VISIT (OUTPATIENT)
Dept: FAMILY MEDICINE | Facility: CLINIC | Age: 74
End: 2019-01-29
Payer: MEDICARE

## 2019-01-29 VITALS
SYSTOLIC BLOOD PRESSURE: 120 MMHG | OXYGEN SATURATION: 98 % | BODY MASS INDEX: 21.83 KG/M2 | HEIGHT: 65 IN | WEIGHT: 131 LBS | HEART RATE: 61 BPM | TEMPERATURE: 99 F | DIASTOLIC BLOOD PRESSURE: 66 MMHG

## 2019-01-29 DIAGNOSIS — I10 ESSENTIAL HYPERTENSION: Primary | ICD-10-CM

## 2019-01-29 DIAGNOSIS — E78.01 FAMILIAL HYPERCHOLESTEROLEMIA: ICD-10-CM

## 2019-01-29 DIAGNOSIS — I34.1 MVP (MITRAL VALVE PROLAPSE): ICD-10-CM

## 2019-01-29 DIAGNOSIS — Z23 NEED FOR PROPHYLACTIC VACCINATION AND INOCULATION AGAINST INFLUENZA: ICD-10-CM

## 2019-01-29 PROCEDURE — 99212 OFFICE O/P EST SF 10 MIN: CPT | Mod: 25,,, | Performed by: INTERNAL MEDICINE

## 2019-01-29 PROCEDURE — 99212 PR OFFICE/OUTPT VISIT, EST, LEVL II, 10-19 MIN: ICD-10-PCS | Mod: 25,,, | Performed by: INTERNAL MEDICINE

## 2019-01-29 PROCEDURE — 90662 IIV NO PRSV INCREASED AG IM: CPT | Mod: ,,, | Performed by: INTERNAL MEDICINE

## 2019-01-29 PROCEDURE — G0008 FLU VACCINE - HIGH DOSE (65+) PRESERVATIVE FREE IM: ICD-10-PCS | Mod: ,,, | Performed by: INTERNAL MEDICINE

## 2019-01-29 PROCEDURE — G0008 ADMIN INFLUENZA VIRUS VAC: HCPCS | Mod: ,,, | Performed by: INTERNAL MEDICINE

## 2019-01-29 PROCEDURE — 90662 FLU VACCINE - HIGH DOSE (65+) PRESERVATIVE FREE IM: ICD-10-PCS | Mod: ,,, | Performed by: INTERNAL MEDICINE

## 2019-01-29 RX ORDER — ATENOLOL 50 MG/1
50 TABLET ORAL DAILY
Qty: 90 TABLET | Refills: 1 | Status: SHIPPED | OUTPATIENT
Start: 2019-01-29 | End: 2019-04-29 | Stop reason: SDUPTHER

## 2019-01-29 RX ORDER — PRAVASTATIN SODIUM 40 MG/1
40 TABLET ORAL DAILY
Qty: 90 TABLET | Refills: 1 | Status: SHIPPED | OUTPATIENT
Start: 2019-01-29 | End: 2019-07-05 | Stop reason: SDUPTHER

## 2019-01-29 NOTE — PROGRESS NOTES
Subjective:       Patient ID: Jonathon Pantoja is a 73 y.o. male.    Chief Complaint: Hypertension (continuity of care )    Here for follow up on changes to blood pressure medication.  He has noticed less lightheadedness and a little more energy.  Still gets lightheaded at times if he stands too fast when getting out of bed in the morning.  Overall, he is feeling better.      Review of Systems   Constitutional: Negative for chills, fatigue, fever and unexpected weight change.   HENT: Positive for congestion and postnasal drip. Negative for hearing loss, rhinorrhea, trouble swallowing and voice change.    Eyes: Negative for photophobia and visual disturbance.   Respiratory: Positive for cough. Negative for apnea, choking, chest tightness, shortness of breath and wheezing.    Cardiovascular: Negative for chest pain, palpitations and leg swelling.   Gastrointestinal: Negative for abdominal pain, blood in stool, constipation, diarrhea, nausea, rectal pain and vomiting.   Endocrine: Negative for cold intolerance, heat intolerance, polydipsia and polyuria.   Genitourinary: Negative for decreased urine volume, difficulty urinating, discharge, dysuria, flank pain, frequency, genital sores, hematuria, testicular pain and urgency.   Musculoskeletal: Negative for arthralgias, back pain, gait problem, joint swelling, myalgias and neck pain.   Skin: Negative for color change, rash and wound.   Allergic/Immunologic: Negative for environmental allergies and food allergies.   Neurological: Positive for speech difficulty. Negative for dizziness, tremors, seizures, syncope, facial asymmetry, weakness, light-headedness, numbness and headaches.   Hematological: Negative for adenopathy. Bruises/bleeds easily.   Psychiatric/Behavioral: Negative for confusion, hallucinations, sleep disturbance and suicidal ideas. The patient is nervous/anxious.        Past Medical History:   Diagnosis Date    Chronic systolic congestive heart failure      Hyperglycemia     Hyperlipidemia     Hypertension     MVP (mitral valve prolapse)       Past Surgical History:   Procedure Laterality Date    HERNIA REPAIR Bilateral        Family History   Problem Relation Age of Onset    Alzheimer's disease Mother     No Known Problems Father        Social History     Socioeconomic History    Marital status:      Spouse name: None    Number of children: None    Years of education: None    Highest education level: None   Social Needs    Financial resource strain: None    Food insecurity - worry: None    Food insecurity - inability: None    Transportation needs - medical: None    Transportation needs - non-medical: None   Occupational History    Occupation: retired   Tobacco Use    Smoking status: Former Smoker    Smokeless tobacco: Never Used   Substance and Sexual Activity    Alcohol use: Yes     Comment: occasional    Drug use: No    Sexual activity: No   Other Topics Concern    None   Social History Narrative    Live with wife       Current Outpatient Medications   Medication Sig Dispense Refill    aspirin (ECOTRIN) 81 MG EC tablet Take 1 tablet by mouth once daily.      atenolol (TENORMIN) 50 MG tablet Take 1 tablet (50 mg total) by mouth once daily. 90 tablet 1    carbidopa-levodopa  mg (SINEMET)  mg per tablet Take 1 tablet by mouth 4 (four) times daily.       donepezil (ARICEPT) 10 MG tablet Take 1 tablet by mouth once daily.      irbesartan (AVAPRO) 300 MG tablet Take 1 tablet (300 mg total) by mouth every evening. 90 tablet 1    multivitamin (THERAGRAN) per tablet Take 1 tablet by mouth once daily.      pravastatin (PRAVACHOL) 40 MG tablet Take 1 tablet (40 mg total) by mouth once daily. 90 tablet 1    ALPRAZolam (XANAX) 0.5 MG tablet Take 1 tablet (0.5 mg total) by mouth 3 (three) times daily. 60 tablet 0     No current facility-administered medications for this visit.        Review of patient's allergies indicates:  No  "Known Allergies  Objective:    HPI     Hypertension      Additional comments: continuity of care           Last edited by Seven Morales MA on 1/29/2019  1:04 PM. (History)      Blood pressure 120/66, pulse 61, temperature 98.7 °F (37.1 °C), temperature source Temporal, height 5' 5" (1.651 m), weight 59.4 kg (131 lb), SpO2 98 %. Body mass index is 21.8 kg/m².   Physical Exam   Constitutional: He appears well-developed and well-nourished.   Cardiovascular: Normal rate, regular rhythm and normal heart sounds.   Pulmonary/Chest: Effort normal and breath sounds normal.   Nursing note and vitals reviewed.          Assessment:       1. Essential hypertension    2. MVP (mitral valve prolapse)    3. Familial hypercholesterolemia    4. Need for prophylactic vaccination and inoculation against influenza        Plan:       Jonathon was seen today for hypertension.    Diagnoses and all orders for this visit:    Essential hypertension  -     atenolol (TENORMIN) 50 MG tablet; Take 1 tablet (50 mg total) by mouth once daily.    MVP (mitral valve prolapse)  -     atenolol (TENORMIN) 50 MG tablet; Take 1 tablet (50 mg total) by mouth once daily.    Familial hypercholesterolemia  -     pravastatin (PRAVACHOL) 40 MG tablet; Take 1 tablet (40 mg total) by mouth once daily.    Need for prophylactic vaccination and inoculation against influenza  -     Influenza - High Dose (65+) (PF) (IM)         "

## 2019-03-12 DIAGNOSIS — I10 ESSENTIAL HYPERTENSION: ICD-10-CM

## 2019-03-13 RX ORDER — IRBESARTAN 300 MG/1
300 TABLET ORAL NIGHTLY
Qty: 90 TABLET | Refills: 1 | Status: SHIPPED | OUTPATIENT
Start: 2019-03-13 | End: 2019-09-04 | Stop reason: SDUPTHER

## 2019-04-29 ENCOUNTER — OFFICE VISIT (OUTPATIENT)
Dept: FAMILY MEDICINE | Facility: CLINIC | Age: 74
End: 2019-04-29
Payer: MEDICARE

## 2019-04-29 VITALS
TEMPERATURE: 98 F | SYSTOLIC BLOOD PRESSURE: 134 MMHG | WEIGHT: 130 LBS | HEIGHT: 65 IN | HEART RATE: 67 BPM | OXYGEN SATURATION: 99 % | DIASTOLIC BLOOD PRESSURE: 78 MMHG | BODY MASS INDEX: 21.66 KG/M2

## 2019-04-29 DIAGNOSIS — I34.1 MVP (MITRAL VALVE PROLAPSE): ICD-10-CM

## 2019-04-29 DIAGNOSIS — I10 ESSENTIAL HYPERTENSION: Primary | ICD-10-CM

## 2019-04-29 DIAGNOSIS — R23.2 HOT FLASHES: ICD-10-CM

## 2019-04-29 PROCEDURE — 99213 OFFICE O/P EST LOW 20 MIN: CPT | Mod: ,,, | Performed by: INTERNAL MEDICINE

## 2019-04-29 PROCEDURE — 99213 PR OFFICE/OUTPT VISIT, EST, LEVL III, 20-29 MIN: ICD-10-PCS | Mod: ,,, | Performed by: INTERNAL MEDICINE

## 2019-04-29 RX ORDER — ATENOLOL 50 MG/1
50 TABLET ORAL DAILY
Qty: 90 TABLET | Refills: 1 | Status: SHIPPED | OUTPATIENT
Start: 2019-04-29 | End: 2019-10-01 | Stop reason: SDUPTHER

## 2019-04-29 NOTE — PROGRESS NOTES
Subjective:       Patient ID: Jonathon Pantoja is a 73 y.o. male.    Chief Complaint: Hypertension (continuity of care)    Hypertension   This is a chronic problem. The problem is controlled. Associated symptoms include shortness of breath. Pertinent negatives include no chest pain, headaches, neck pain, palpitations or peripheral edema. (Still gets a little dizziness/lightheadedness when he first gets up in AM but does fine rest of the day.  This has been better since BP meds adjusted  ) Past treatments include beta blockers and angiotensin blockers.     Review of Systems   Constitutional: Negative for chills, fatigue, fever and unexpected weight change.        Frequent sweats   HENT: Positive for hearing loss, rhinorrhea, trouble swallowing and voice change. Negative for congestion and postnasal drip.    Eyes: Negative for photophobia and visual disturbance.   Respiratory: Positive for shortness of breath. Negative for apnea, cough, choking, chest tightness and wheezing.    Cardiovascular: Negative for chest pain, palpitations and leg swelling.   Gastrointestinal: Positive for constipation. Negative for abdominal pain, blood in stool, diarrhea, nausea, rectal pain and vomiting.   Endocrine: Positive for cold intolerance and heat intolerance (has been getting hot flashes/ sweats several days per week last several months; usually occurs in the evenings.    ). Negative for polydipsia and polyuria.   Genitourinary: Positive for frequency and urgency. Negative for decreased urine volume, difficulty urinating, discharge, dysuria, flank pain, genital sores, hematuria and testicular pain.   Musculoskeletal: Negative for arthralgias, back pain, gait problem, joint swelling, myalgias and neck pain.   Skin: Negative for color change, rash and wound.   Allergic/Immunologic: Negative for environmental allergies and food allergies.   Neurological: Positive for speech difficulty and light-headedness. Negative for dizziness,  tremors, seizures, syncope, facial asymmetry, weakness, numbness and headaches.   Hematological: Negative for adenopathy. Bruises/bleeds easily.   Psychiatric/Behavioral: Positive for confusion. Negative for hallucinations, sleep disturbance and suicidal ideas. The patient is nervous/anxious.        Past Medical History:   Diagnosis Date    Chronic systolic congestive heart failure     Hyperglycemia     Hyperlipidemia     Hypertension     MVP (mitral valve prolapse)       Past Surgical History:   Procedure Laterality Date    HERNIA REPAIR Bilateral        Family History   Problem Relation Age of Onset    Alzheimer's disease Mother     No Known Problems Father        Social History     Socioeconomic History    Marital status:      Spouse name: Not on file    Number of children: Not on file    Years of education: Not on file    Highest education level: Not on file   Occupational History    Occupation: retired   Social Needs    Financial resource strain: Not on file    Food insecurity:     Worry: Not on file     Inability: Not on file    Transportation needs:     Medical: Not on file     Non-medical: Not on file   Tobacco Use    Smoking status: Former Smoker    Smokeless tobacco: Never Used   Substance and Sexual Activity    Alcohol use: Yes     Comment: occasional    Drug use: No    Sexual activity: Never   Lifestyle    Physical activity:     Days per week: Not on file     Minutes per session: Not on file    Stress: To some extent   Relationships    Social connections:     Talks on phone: Not on file     Gets together: Not on file     Attends Orthodoxy service: Not on file     Active member of club or organization: Not on file     Attends meetings of clubs or organizations: Not on file     Relationship status: Not on file   Other Topics Concern    Not on file   Social History Narrative    Live with wife       Current Outpatient Medications   Medication Sig Dispense Refill    aspirin  "(ECOTRIN) 81 MG EC tablet Take 1 tablet by mouth once daily.      atenolol (TENORMIN) 50 MG tablet Take 1 tablet (50 mg total) by mouth once daily. 90 tablet 1    carbidopa-levodopa  mg (SINEMET)  mg per tablet Take 1 tablet by mouth 4 (four) times daily.       donepezil (ARICEPT) 10 MG tablet Take 1 tablet by mouth once daily.      irbesartan (AVAPRO) 300 MG tablet Take 1 tablet (300 mg total) by mouth every evening. 90 tablet 1    multivitamin (THERAGRAN) per tablet Take 1 tablet by mouth once daily.      pravastatin (PRAVACHOL) 40 MG tablet Take 1 tablet (40 mg total) by mouth once daily. 90 tablet 1    ALPRAZolam (XANAX) 0.5 MG tablet Take 1 tablet (0.5 mg total) by mouth 3 (three) times daily. 60 tablet 0     No current facility-administered medications for this visit.        Review of patient's allergies indicates:  No Known Allergies  Objective:    HPI     Hypertension      Additional comments: continuity of care          Last edited by Seven Morales MA on 4/29/2019 12:57 PM. (History)      Blood pressure 134/78, pulse 67, temperature 97.5 °F (36.4 °C), temperature source Temporal, height 5' 5" (1.651 m), weight 59 kg (130 lb), SpO2 99 %. Body mass index is 21.63 kg/m².   Physical Exam   Constitutional: He appears well-developed. No distress.   HENT:   Nose: Nose normal.   Mouth/Throat: Oropharynx is clear and moist.   Eyes: Conjunctivae are normal. Right eye exhibits no discharge. Left eye exhibits no discharge. No scleral icterus.   Neck: Carotid bruit is not present.   Cardiovascular: Normal rate, regular rhythm and normal heart sounds.   No murmur heard.  Pulmonary/Chest: Effort normal and breath sounds normal. No respiratory distress. He has no decreased breath sounds. He has no wheezes. He has no rhonchi. He has no rales.   Abdominal: Soft. He exhibits no distension. There is no tenderness. There is no rebound and no guarding.   Musculoskeletal: He exhibits no edema.   Neurological: " He is alert. He displays no tremor.   Skin: Skin is warm and dry.   Psychiatric: He has a normal mood and affect. His speech is normal.   Nursing note and vitals reviewed.          Assessment:       1. Essential hypertension    2. MVP (mitral valve prolapse)    3. Hot flashes        Plan:       Jonathon was seen today for hypertension.    Diagnoses and all orders for this visit:    Essential hypertension  Comments:  Same meds    Orders:  -     atenolol (TENORMIN) 50 MG tablet; Take 1 tablet (50 mg total) by mouth once daily.    MVP (mitral valve prolapse)  -     atenolol (TENORMIN) 50 MG tablet; Take 1 tablet (50 mg total) by mouth once daily.    Hot flashes  Comments:  I suspect theses are a SE of the sinemet.  It was increased from TID to QID in the last few months

## 2019-07-05 DIAGNOSIS — E78.01 FAMILIAL HYPERCHOLESTEROLEMIA: ICD-10-CM

## 2019-07-05 RX ORDER — PRAVASTATIN SODIUM 40 MG/1
TABLET ORAL
Qty: 90 TABLET | Refills: 1 | Status: SHIPPED | OUTPATIENT
Start: 2019-07-05 | End: 2019-10-01 | Stop reason: SDUPTHER

## 2019-09-04 DIAGNOSIS — I10 ESSENTIAL HYPERTENSION: ICD-10-CM

## 2019-09-04 RX ORDER — IRBESARTAN 300 MG/1
TABLET ORAL
Qty: 90 TABLET | Refills: 0 | Status: SHIPPED | OUTPATIENT
Start: 2019-09-04 | End: 2019-10-01 | Stop reason: SDUPTHER

## 2019-09-23 ENCOUNTER — TELEPHONE (OUTPATIENT)
Dept: FAMILY MEDICINE | Facility: CLINIC | Age: 74
End: 2019-09-23

## 2019-09-23 DIAGNOSIS — E78.5 HYPERLIPIDEMIA, UNSPECIFIED HYPERLIPIDEMIA TYPE: Primary | ICD-10-CM

## 2019-09-26 LAB
ALBUMIN SERPL-MCNC: 4.5 G/DL (ref 3.5–4.8)
ALBUMIN/GLOB SERPL: 2.8 {RATIO} (ref 1.2–2.2)
ALP SERPL-CCNC: 64 IU/L (ref 39–117)
ALT SERPL-CCNC: 6 IU/L (ref 0–44)
AST SERPL-CCNC: 19 IU/L (ref 0–40)
BILIRUB SERPL-MCNC: 0.8 MG/DL (ref 0–1.2)
BUN SERPL-MCNC: 15 MG/DL (ref 8–27)
BUN/CREAT SERPL: 16 (ref 10–24)
CALCIUM SERPL-MCNC: 9.2 MG/DL (ref 8.6–10.2)
CHLORIDE SERPL-SCNC: 106 MMOL/L (ref 96–106)
CHOLEST SERPL-MCNC: 129 MG/DL (ref 100–199)
CO2 SERPL-SCNC: 26 MMOL/L (ref 20–29)
CREAT SERPL-MCNC: 0.91 MG/DL (ref 0.76–1.27)
GLOBULIN SER CALC-MCNC: 1.6 G/DL (ref 1.5–4.5)
GLUCOSE SERPL-MCNC: 121 MG/DL (ref 65–99)
HDLC SERPL-MCNC: 68 MG/DL
LDLC SERPL CALC-MCNC: 52 MG/DL (ref 0–99)
POTASSIUM SERPL-SCNC: 4.1 MMOL/L (ref 3.5–5.2)
PROT SERPL-MCNC: 6.1 G/DL (ref 6–8.5)
SODIUM SERPL-SCNC: 144 MMOL/L (ref 134–144)
TRIGL SERPL-MCNC: 45 MG/DL (ref 0–149)
VLDLC SERPL CALC-MCNC: 9 MG/DL (ref 5–40)

## 2019-10-01 ENCOUNTER — OFFICE VISIT (OUTPATIENT)
Dept: FAMILY MEDICINE | Facility: CLINIC | Age: 74
End: 2019-10-01
Payer: MEDICARE

## 2019-10-01 ENCOUNTER — HOSPITAL ENCOUNTER (OUTPATIENT)
Dept: RADIOLOGY | Facility: HOSPITAL | Age: 74
Discharge: HOME | End: 2019-10-01
Attending: FAMILY MEDICINE
Payer: MEDICARE

## 2019-10-01 VITALS
OXYGEN SATURATION: 96 % | TEMPERATURE: 99 F | HEIGHT: 65 IN | HEART RATE: 67 BPM | DIASTOLIC BLOOD PRESSURE: 80 MMHG | WEIGHT: 129 LBS | BODY MASS INDEX: 21.49 KG/M2 | SYSTOLIC BLOOD PRESSURE: 120 MMHG

## 2019-10-01 DIAGNOSIS — I10 ESSENTIAL HYPERTENSION: ICD-10-CM

## 2019-10-01 DIAGNOSIS — G20.A1 PARKINSON DISEASE: ICD-10-CM

## 2019-10-01 DIAGNOSIS — R06.02 SHORTNESS OF BREATH: ICD-10-CM

## 2019-10-01 DIAGNOSIS — I34.1 MVP (MITRAL VALVE PROLAPSE): ICD-10-CM

## 2019-10-01 DIAGNOSIS — R06.02 SHORTNESS OF BREATH: Primary | ICD-10-CM

## 2019-10-01 DIAGNOSIS — I10 ESSENTIAL HYPERTENSION: Primary | ICD-10-CM

## 2019-10-01 DIAGNOSIS — E78.01 FAMILIAL HYPERCHOLESTEROLEMIA: ICD-10-CM

## 2019-10-01 DIAGNOSIS — G20.A1 PARKINSON'S DISEASE: ICD-10-CM

## 2019-10-01 PROCEDURE — 99214 PR OFFICE/OUTPT VISIT, EST, LEVL IV, 30-39 MIN: ICD-10-PCS | Mod: S$GLB,,, | Performed by: FAMILY MEDICINE

## 2019-10-01 PROCEDURE — 71046 X-RAY EXAM CHEST 2 VIEWS: CPT | Mod: TC,PO

## 2019-10-01 PROCEDURE — 99214 OFFICE O/P EST MOD 30 MIN: CPT | Mod: S$GLB,,, | Performed by: FAMILY MEDICINE

## 2019-10-01 RX ORDER — ACETAMINOPHEN, DIPHENHYDRAMINE HCL, PHENYLEPHRINE HCL 325; 25; 5 MG/1; MG/1; MG/1
1 TABLET ORAL NIGHTLY
COMMUNITY

## 2019-10-01 RX ORDER — ATENOLOL 50 MG/1
50 TABLET ORAL DAILY
Qty: 90 TABLET | Refills: 1 | Status: SHIPPED | OUTPATIENT
Start: 2019-10-01 | End: 2020-10-15 | Stop reason: SDUPTHER

## 2019-10-01 RX ORDER — IRBESARTAN 300 MG/1
300 TABLET ORAL NIGHTLY
Qty: 90 TABLET | Refills: 0 | Status: SHIPPED | OUTPATIENT
Start: 2019-10-01 | End: 2020-03-03

## 2019-10-01 RX ORDER — PRAVASTATIN SODIUM 40 MG/1
40 TABLET ORAL DAILY
Qty: 90 TABLET | Refills: 1 | Status: SHIPPED | OUTPATIENT
Start: 2019-10-01 | End: 2020-03-29

## 2019-10-01 NOTE — PATIENT INSTRUCTIONS
Common Symptoms of Parkinson Disease  You have Parkinson disease. This disease is caused by a loss of a chemical in your brain needed to help control movement and balance. For reasons that are not clear, cells that make this brain chemical stop working. This causes symptoms. This sheet tells you more about symptoms of Parkinson disease.    How symptoms may affect you  Parkinson disease symptoms vary for each person. You may have many severe symptoms. Or you may have only a few mild ones. Your symptoms may involve only one side of your body. Or they may involve both sides of your body. Also, your symptoms may change over time. And you may have different symptoms at different stages. Your symptoms may also get worse as your disease progresses.  Symptoms that affect movement and balance  These can include:  · Tremor (shaking). This is a very common symptom. Most often, a hand or arm shakes on one or both sides of the body. Tremor may also affect other areas of the body, such as a leg, a foot, or the chin. Shaking may lessen when the affected part is used. It may worsen when at rest.  · Rigidity. This refers to having stiff or tight muscles. This happens because the muscles dont get the signal to relax. Rigidity may cause muscle pain and cramping. It may also cause a stooped posture.  · Problems with balance. This can affect how well you stand and move. This can also increase your risk of falls.  Other symptoms  Other symptoms of Parkinson disease include speaking too softly and in a monotone, writing that gets shaky and smaller across the page, and trouble swallowing. They also include constipation, oily skin, and changes in blood pressure. Memory loss and other problems with thinking can occur later in the disease progression. Bradykinesia--or slow movement--can also occur. This can cause problems with actions such as getting out of chairs and beds. Walking may be limited to short, shuffling steps. You may feel  ""frozen," or unable to move. Blinking, facial expressions, swinging of your arms when walking, and other "unconscious movements" are also slowed down. Some people may have problems with their urination and others may also feel depressed.  Date Last Reviewed: 9/26/2015  © 1321-5464 [employer]. [address] [place] [address], [place] [address]. All rights reserved. This information is not intended as a substitute for professional medical care. Always follow your healthcare professional's instructions.        "

## 2019-10-01 NOTE — PROGRESS NOTES
Subjective:       Patient ID: Jonathon Pantoja is a 74 y.o. male.    Chief Complaint: Hypertension (continuity of care and med refill) and Hyperlipidemia    Increased SOB x 6m. Sleep disturbance with snoring. Parkinsons accelerating    Hypertension   This is a chronic problem. The current episode started more than 1 year ago. The problem is unchanged. The problem is controlled. Associated symptoms include shortness of breath. Pertinent negatives include no anxiety, chest pain or headaches.   Hyperlipidemia   Associated symptoms include shortness of breath. Pertinent negatives include no chest pain or myalgias.     Review of Systems   Constitutional: Positive for fatigue. Negative for activity change, appetite change and fever.   HENT: Negative for congestion and sore throat.    Eyes: Negative for visual disturbance.   Respiratory: Positive for apnea and shortness of breath. Negative for cough and chest tightness.    Cardiovascular: Negative for chest pain.   Gastrointestinal: Negative for abdominal pain, constipation, diarrhea and nausea.   Genitourinary: Negative for difficulty urinating.   Musculoskeletal: Positive for gait problem. Negative for back pain and myalgias.   Neurological: Negative for headaches.   Hematological: Negative for adenopathy.   Psychiatric/Behavioral: Negative for dysphoric mood and suicidal ideas.       Past Medical History:   Diagnosis Date    Chronic systolic congestive heart failure     Hyperglycemia     Hyperlipidemia     Hypertension     MVP (mitral valve prolapse)       Past Surgical History:   Procedure Laterality Date    HERNIA REPAIR Bilateral        Family History   Problem Relation Age of Onset    Alzheimer's disease Mother     No Known Problems Father        Social History     Socioeconomic History    Marital status:      Spouse name: Not on file    Number of children: Not on file    Years of education: Not on file    Highest education level: Not on file    Occupational History    Occupation: retired   Social Needs    Financial resource strain: Not on file    Food insecurity:     Worry: Not on file     Inability: Not on file    Transportation needs:     Medical: Not on file     Non-medical: Not on file   Tobacco Use    Smoking status: Former Smoker    Smokeless tobacco: Never Used   Substance and Sexual Activity    Alcohol use: Yes     Comment: occasional    Drug use: No    Sexual activity: Never   Lifestyle    Physical activity:     Days per week: Not on file     Minutes per session: Not on file    Stress: To some extent   Relationships    Social connections:     Talks on phone: Not on file     Gets together: Not on file     Attends Yazdanism service: Not on file     Active member of club or organization: Not on file     Attends meetings of clubs or organizations: Not on file     Relationship status: Not on file   Other Topics Concern    Not on file   Social History Narrative    Live with wife       Current Outpatient Medications   Medication Sig Dispense Refill    aspirin (ECOTRIN) 81 MG EC tablet Take 1 tablet by mouth once daily.      atenolol (TENORMIN) 50 MG tablet Take 1 tablet (50 mg total) by mouth once daily. 90 tablet 1    carbidopa-levodopa  mg (SINEMET)  mg per tablet Take 1 tablet by mouth 4 (four) times daily.       donepezil (ARICEPT) 10 MG tablet Take 1 tablet by mouth once daily.      irbesartan (AVAPRO) 300 MG tablet Take 1 tablet (300 mg total) by mouth every evening. 90 tablet 0    multivitamin (THERAGRAN) per tablet Take 1 tablet by mouth once daily.      pravastatin (PRAVACHOL) 40 MG tablet Take 1 tablet (40 mg total) by mouth once daily. 90 tablet 1    ALPRAZolam (XANAX) 0.5 MG tablet Take 1 tablet (0.5 mg total) by mouth 3 (three) times daily. 60 tablet 0    melatonin 10 mg Tab Take 1 tablet by mouth every evening.       No current facility-administered medications for this visit.        Review of patient's  "allergies indicates:  No Known Allergies  Objective:    HPI     Hypertension      Additional comments: continuity of care and med refill          Last edited by Seven Morales MA on 10/1/2019 10:09 AM. (History)      Blood pressure 120/80, pulse 67, temperature 99 °F (37.2 °C), temperature source Temporal, height 5' 5" (1.651 m), weight 58.5 kg (129 lb), SpO2 96 %. Body mass index is 21.47 kg/m².   Physical Exam   Constitutional: He is oriented to person, place, and time. He appears well-developed and well-nourished.   HENT:   Head: Atraumatic.   Eyes: Pupils are equal, round, and reactive to light. EOM are normal. Right pupil is round. Left pupil is round.   Neck: Normal range of motion. Neck supple. No thyromegaly present.   Cardiovascular: Normal rate and regular rhythm.   No murmur heard.  Pulmonary/Chest: Breath sounds normal. No respiratory distress. He has no wheezes. He has no rales.   Shallow breaths   Abdominal: There is no hepatosplenomegaly. There is no tenderness.   Musculoskeletal: Normal range of motion. He exhibits no edema.   Lymphadenopathy:     He has no cervical adenopathy.        Right: No supraclavicular adenopathy present.        Left: No supraclavicular adenopathy present.   Neurological: He is alert and oriented to person, place, and time. He has normal strength. No cranial nerve deficit or sensory deficit. He exhibits abnormal muscle tone.   resting hand tremor bilateral   Skin: Skin is warm and dry.   Psychiatric: He has a normal mood and affect. His behavior is normal. Judgment and thought content normal. He expresses no suicidal plans.           Assessment:       1. Essential hypertension    2. MVP (mitral valve prolapse)    3. Essential hypertension    4. Familial hypercholesterolemia    5. Shortness of breath    6. Parkinson's disease        Plan:       Jonathon was seen today for hypertension and hyperlipidemia.    Diagnoses and all orders for this visit:    Essential " hypertension    Orders:  -     irbesartan (AVAPRO) 300 MG tablet; Take 1 tablet (300 mg total) by mouth every evening.  -     atenolol (TENORMIN) 50 MG tablet; Take 1 tablet (50 mg total) by mouth once daily.    MVP (mitral valve prolapse)  -     atenolol (TENORMIN) 50 MG tablet; Take 1 tablet (50 mg total) by mouth once daily.    Essential hypertension    Orders:  -     irbesartan (AVAPRO) 300 MG tablet; Take 1 tablet (300 mg total) by mouth every evening.  -     atenolol (TENORMIN) 50 MG tablet; Take 1 tablet (50 mg total) by mouth once daily.    Familial hypercholesterolemia  -     pravastatin (PRAVACHOL) 40 MG tablet; Take 1 tablet (40 mg total) by mouth once daily.    Shortness of breath  -     X-Ray Chest PA And Lateral; Future  -     Pulmonary function test; Future  -     Ambulatory Referral to Pulmonology    Parkinson's disease  -     Pulmonary function test; Future  -     Ambulatory Referral to Pulmonology

## 2019-10-08 ENCOUNTER — INITIAL CONSULT (OUTPATIENT)
Dept: PULMONOLOGY | Facility: CLINIC | Age: 74
End: 2019-10-08
Payer: MEDICARE

## 2019-10-08 VITALS
BODY MASS INDEX: 21.49 KG/M2 | SYSTOLIC BLOOD PRESSURE: 110 MMHG | HEART RATE: 77 BPM | OXYGEN SATURATION: 100 % | WEIGHT: 129 LBS | HEIGHT: 65 IN | DIASTOLIC BLOOD PRESSURE: 60 MMHG

## 2019-10-08 DIAGNOSIS — R06.02 SHORTNESS OF BREATH: Primary | ICD-10-CM

## 2019-10-08 DIAGNOSIS — I50.22 CHRONIC SYSTOLIC HEART FAILURE: ICD-10-CM

## 2019-10-08 DIAGNOSIS — I51.89 DIASTOLIC DYSFUNCTION: ICD-10-CM

## 2019-10-08 DIAGNOSIS — G20.A1 PARKINSON'S DISEASE: ICD-10-CM

## 2019-10-08 PROCEDURE — 99214 OFFICE O/P EST MOD 30 MIN: CPT | Mod: S$GLB,,, | Performed by: INTERNAL MEDICINE

## 2019-10-08 PROCEDURE — 99214 PR OFFICE/OUTPT VISIT, EST, LEVL IV, 30-39 MIN: ICD-10-PCS | Mod: S$GLB,,, | Performed by: INTERNAL MEDICINE

## 2019-10-08 NOTE — LETTER
October 8, 2019      MIKAELA Walters MD  [address]  [place] [address]           [facility] - Pulmonology  [address]  [place] [address]  Phone: [phone]  Fax: [phone]          Patient: Jonathon Pantoja   MR Number: [id number]   YOB: 1945   Date of Visit: 10/8/2019       Dear Dr. MIKAELA Walters:    Thank you for referring Jonathon Pantoja to me for evaluation. Attached you will find relevant portions of my assessment and plan of care.    If you have questions, please do not hesitate to call me. I look forward to following Jonathon Pantoja along with you.    Sincerely,    Brian Dalal MD    Enclosure  CC:  No Recipients    If you would like to receive this communication electronically, please contact [email or web address] or [phone] to request more information on [employer] Link access.    For providers and/or their staff who would like to refer a patient to [facility], please contact us through our one-stop-shop provider referral line, [facility], at [phone].    If you feel you have received this communication in error or would no longer like to receive these types of communications, please e-mail [email or web address]

## 2019-10-08 NOTE — PROGRESS NOTES
[facility]  Pulmonology  Initial Clinic Visit    SUBJECTIVE:   Reason for Referral:    Jonathon Pantoja is a 74 y.o. male referred for shortness of breath.    Chief Complaint:  Chief Complaint   Patient presents with    Shortness of Breath        History of Present Illness:  He reports relatively abrupt onset of shortness of breath 4-6 months ago which is progressed unchanged since that time.  He has a hard time providing much of a clear description of his symptoms, he is able to tell me that he rides a bike 3 hr a day most days of the week and is not limited by any shortness of breath or breathing issues.    Dyspnea  Patient complains of shortness of breath. Symptoms occur at rest. Symptoms began 5 months ago, unchanged since. Associated symptoms include  Nothing. He denies constant cough, cough after eating, drainage from nose, dry cough, dyspnea on exertion, dyspnea when laying down, edema In his legs, frequent throat clearing, morning cough, post nasal drip, sputum production, tightness in chest and unresolving pneumonia. He has not had recent travel. Weight has been stable. Symptoms are exacerbated by Nothing. Symptoms are alleviated by nothing.     He is not on home oxygen.  He is a past smoker who quit In the remote past    Past Medical History:   Diagnosis Date    Abnormal fasting glucose     Hyperlipidemia     Hypertension     Parkinson's disease      Past Surgical History:   Procedure Laterality Date    INGUINAL HERNIA REPAIR Bilateral        Family History:  Family History   Problem Relation Age of Onset    Alzheimer's disease Mother 94    Heart attack Father 50    Dementia Brother 74    Heart attack Brother 50        Social History:   reports that he quit smoking about 25 years ago. His smoking use included cigarettes. He started smoking about 50 years ago. He has a 50.00 pack-year smoking history. He has never used smokeless tobacco.    reports that he drinks about 2.0 standard  drinks of alcohol per week.    reports that he does not use drugs.   Data Unavailable     Allergies:  Patient has no known allergies.     Outpatient Medications as of 10/8/2019   Medication    aspirin (ECOTRIN) 81 MG EC tablet    atenolol (TENORMIN) 50 MG tablet    carbidopa-levodopa  mg (SINEMET)  mg per tablet    donepezil (ARICEPT) 10 MG tablet    irbesartan (AVAPRO) 300 MG tablet    melatonin 10 mg Tab    multivitamin (THERAGRAN) per tablet    pravastatin (PRAVACHOL) 40 MG tablet     No current facility-administered medications on file as of 10/8/2019.       Review of Systems   Constitutional: Negative for fever, weight loss, weight gain, fatigue and weakness.   HENT: Negative for postnasal drip, rhinorrhea and sinus pressure.    Eyes: Negative for redness and itching.   Respiratory: Positive for shortness of breath. Negative for apnea, snoring, cough, hemoptysis, sputum production, choking, wheezing, orthopnea, previous hospitialization due to pulmonary problems, asthma nighttime symptoms, pleurisy, dyspnea on extertion, use of rescue inhaler, somnolence and Paroxysmal Nocturnal Dyspnea.    Cardiovascular: Negative for chest pain, palpitations and leg swelling.   Genitourinary: Negative for difficulty urinating and hematuria.   Endocrine: Negative for polydipsia, polyphagia and polyuria.    Musculoskeletal: Negative for gait problem, joint swelling and myalgias.   Skin: Negative for rash.   Gastrointestinal: Negative for nausea, vomiting, abdominal pain and acid reflux.   Neurological: Negative for syncope, weakness and headaches.   Hematological: Negative for adenopathy. Does not bruise/bleed easily and no excessive bruising.   Psychiatric/Behavioral: Positive for sleep disturbance. Negative for confusion. The patient is nervous/anxious.    All other systems reviewed and are negative.       I have personally reviewed the following: active problem list, medication list, allergies, family  "history, social history, health maintenance, notes from last encounter, lab results, endoscopy procedure notes    OBJECTIVE:      /60 (BP Location: Left arm, Patient Position: Sitting, BP Method: Medium (Manual))   Pulse 77   Ht 5' 5" (1.651 m)   Wt 58.5 kg (129 lb)   SpO2 100%   BMI 21.47 kg/m²   Body mass index is 21.47 kg/m².     Physical Exam   Constitutional: He is oriented to person, place, and time. He appears well-developed and well-nourished. He appears not cachectic. No distress. He is not obese.   HENT:   Head: Normocephalic.   Right Ear: External ear normal.   Left Ear: External ear normal.   Nose: Nose normal. No mucosal edema.   Mouth/Throat: Oropharynx is clear and moist. Normal dentition. No oropharyngeal exudate. Mallampati Score: II.   Neck: Normal range of motion. Neck supple. No JVD present. No tracheal deviation present. No thyromegaly present.   Cardiovascular: Normal rate, regular rhythm, normal heart sounds and intact distal pulses. Exam reveals no gallop and no friction rub.   No murmur heard.  Pulmonary/Chest: Normal expansion, hyperinflation, symmetric chest wall expansion, effort normal and breath sounds normal. No stridor. No respiratory distress. He has no decreased breath sounds. He has no wheezes. He has no rhonchi. He has no rales. Chest wall is not dull to percussion. He exhibits no tenderness. Negative for egophony. Negative for tactile fremitus.   Abdominal: Soft. Bowel sounds are normal. He exhibits no distension. There is no tenderness. There is no guarding.   Musculoskeletal: Normal range of motion. He exhibits no edema, tenderness or deformity.   Lymphadenopathy:     He has no cervical adenopathy.   Neurological: He is alert and oriented to person, place, and time. He has normal strength. He displays tremor. No cranial nerve deficit or sensory deficit. He exhibits abnormal muscle tone. Coordination and gait abnormal. GCS eye subscore is 4. GCS verbal subscore is 5. " GCS motor subscore is 6.   Skin: Skin is warm and dry. No rash noted. He is not diaphoretic. No cyanosis or erythema. No pallor. Nails show no clubbing.   Psychiatric: He has a normal mood and affect. His behavior is normal. Judgment and thought content normal.   Nursing note and vitals reviewed.     Diagnostic Studies:  I have personally reviewed the following labs/studies/images.    Chest x-ray:   Date:  October 1, 2019  Radiology Report::    No acute cardiac or pulmonary process.  My impression:    Normal chest x-ray    September 20, 2019  Radiology report:  No acute pulmonary process.  My impression:  Normal chest x-ray.    I independently viewed the above imaging/lab studies in addition to reviewing the report     CT Chest:  None on file.     PFTs:  None on file.     Methacholine Challenge:  None on file.     6MWT:  None on file.     PSG:  None on file.     ECG:  None on file.     Echocardiogram:   Date:  October 11, 2019  · Mild concentric left ventricular hypertrophy.  · Normal left ventricular systolic function. The estimated ejection fraction is 65%  · Grade I (mild) left ventricular diastolic dysfunction consistent with impaired relaxation.  · Normal right ventricular systolic function.  · Mild left atrial enlargement.  · Mild tricuspid regurgitation.  · No pulmonary hypertension present.     BNP  Date:  9/18/2018  47.7     Assessment:       1. Shortness of breath    2. Parkinson's disease    3. Chronic systolic heart failure    4. Diastolic dysfunction        Impression:  Cryptogenic dyspnea with an unclear etiology.    Plan:   · Will obtain Pulmonary function tests, 6 minute walk test, Echocardiogram, BNP and ABG prior to next visit.  · Continue Parkinson's and HTN medications.  · I informed the patient of my working diagnosis, it's etiology, risk factors, expected symptoms, diagnostic work up, treatment options and prognosis., I personally reviewed the results of relevant imaging/labs/studies with the  patient, and discussed their clinical significance., I spent >10 minutes counseling the patient about their condition., Plan discussed with the patient, who is in agreement., Opportunity provided for the the patient to voice any additional questions or concerns., All questions were answered to the patient's satisfaction., Educational material provided and Patient given date for next visit.    Follow up in about 4 weeks (around 11/5/2019).    Brian Dalal MD  Pulmonary / Critical Care Medicine  [facility]  10/14/2019

## 2019-10-08 NOTE — PATIENT INSTRUCTIONS

## 2019-10-11 ENCOUNTER — CLINICAL SUPPORT (OUTPATIENT)
Dept: CARDIOLOGY | Facility: HOSPITAL | Age: 74
End: 2019-10-11
Attending: INTERNAL MEDICINE
Payer: MEDICARE

## 2019-10-11 DIAGNOSIS — R06.02 SHORTNESS OF BREATH: ICD-10-CM

## 2019-10-11 LAB
AORTIC ROOT ANNULUS: 2.98 CM
AORTIC VALVE CUSP SEPERATION: 1.89 CM
AV INDEX (PROSTH): 0.94
AV MEAN GRADIENT: 3 MMHG
AV PEAK GRADIENT: 6 MMHG
AV VALVE AREA: 2.96 CM2
AV VELOCITY RATIO: 94.26
CV ECHO LV RWT: 0.64 CM
DOP CALC AO PEAK VEL: 1.25 M/S
DOP CALC AO VTI: 25.83 CM
DOP CALC LVOT AREA: 3.1 CM2
DOP CALC LVOT DIAMETER: 2 CM
DOP CALC LVOT PEAK VEL: 117.82 M/S
DOP CALC LVOT STROKE VOLUME: 76.52 CM3
DOP CALCLVOT PEAK VEL VTI: 24.37 CM
E WAVE DECELERATION TIME: 277.64 MSEC
E/A RATIO: 0.51
E/E' RATIO: 5.38 M/S
ECHO LV POSTERIOR WALL: 1.21 CM (ref 0.6–1.1)
FRACTIONAL SHORTENING: 31 % (ref 28–44)
INTERVENTRICULAR SEPTUM: 1.52 CM (ref 0.6–1.1)
IVRT: 70.45 MSEC
LEFT ATRIUM SIZE: 4.68 CM
LEFT INTERNAL DIMENSION IN SYSTOLE: 2.61 CM (ref 2.1–4)
LEFT VENTRICULAR INTERNAL DIMENSION IN DIASTOLE: 3.79 CM (ref 3.5–6)
LEFT VENTRICULAR MASS: 185.91 G
LV LATERAL E/E' RATIO: 4.78 M/S
LV SEPTAL E/E' RATIO: 6.14 M/S
MV PEAK A VEL: 0.85 M/S
MV PEAK E VEL: 0.43 M/S
PISA TR MAX VEL: 2.49 M/S
PV PEAK VELOCITY: 105.11 CM/S
RIGHT VENTRICULAR END-DIASTOLIC DIMENSION: 297 CM
TDI LATERAL: 0.09 M/S
TDI SEPTAL: 0.07 M/S
TDI: 0.08 M/S
TR MAX PG: 25 MMHG

## 2019-10-11 PROCEDURE — 93306 TTE W/DOPPLER COMPLETE: CPT

## 2019-10-30 ENCOUNTER — HOSPITAL ENCOUNTER (OUTPATIENT)
Dept: PULMONOLOGY | Facility: HOSPITAL | Age: 74
Discharge: HOME | End: 2019-10-30
Attending: INTERNAL MEDICINE
Payer: MEDICARE

## 2019-10-30 VITALS — WEIGHT: 117 LBS | HEIGHT: 66 IN | BODY MASS INDEX: 18.8 KG/M2

## 2019-10-30 DIAGNOSIS — G20.A1 PARKINSON'S DISEASE: ICD-10-CM

## 2019-10-30 DIAGNOSIS — I50.22 CHRONIC SYSTOLIC HEART FAILURE: ICD-10-CM

## 2019-10-30 DIAGNOSIS — R06.02 SHORTNESS OF BREATH: ICD-10-CM

## 2019-10-30 LAB
ALLENS TEST: ABNORMAL
DELSYS: ABNORMAL
HCO3 UR-SCNC: 22.6 MMOL/L (ref 24–28)
PCO2 BLDA: 17.7 MMHG (ref 35–45)
PH SMN: 7.72 [PH] (ref 7.35–7.45)
PO2 BLDA: 198 MMHG (ref 80–100)
POC BE: 3 MMOL/L
POC PCO2 TEMP: 17.7 MMHG
POC PH TEMP: 7.72
POC PO2 TEMP: 198 MMHG
POC SATURATED O2: 100 % (ref 95–100)
POC TCO2: 23 MMOL/L (ref 23–27)
POC TEMPERATURE: ABNORMAL
SAMPLE: ABNORMAL
SITE: ABNORMAL

## 2019-10-30 PROCEDURE — 94010 BREATHING CAPACITY TEST: CPT

## 2019-10-30 PROCEDURE — 94729 DIFFUSING CAPACITY: CPT

## 2019-10-30 PROCEDURE — 94727 GAS DIL/WSHOT DETER LNG VOL: CPT

## 2019-10-30 PROCEDURE — 94618 PULMONARY STRESS TESTING: CPT

## 2019-10-30 PROCEDURE — 36600 WITHDRAWAL OF ARTERIAL BLOOD: CPT

## 2019-10-30 NOTE — CARE UPDATE
"   10/30/19 1356   6MW Test   Ordering Provider Dr Brian Dalal   Diagnosis Qualify for Oxygen   Height 5' 6" (1.676 m)   Weight 53.1 kg (117 lb)   BMI (Calculated) 18.9   Predicted Distance 360.47   Patient Race    6MWT Status completed without stopping   Patient Reported No complaints   Was O2 used? No   6MW Distance walked (feet) 1425 feet   Distance walked (meters) 434.34 meters   Did patient stop? No   Type of assistive device(s) used? no assistive devices   Is extra documentation required for this patient? Yes   Pre-Exercise   Oxygen Saturation 96 %   Supplemental Oxygen Room Air   Heart Rate 64 bpm   Cathy Dyspnea Rating  nothing at all   Post Exercise   Oxygen Saturation 95 %   Supplemental Oxygen Room Air   Heart Rate 76 bpm   Cathy Dyspnea Rating  very light   Recovery   Oxygen Saturation 96 %   Supplemental Oxygen Room Air   Heart Rate 74 bpm   Cathy Dyspnea Rating  very, very light (just noticeable)   Interpretation   Is procedure ready for interpretation? Yes   Did the patient stop or pause? No   Total Laps Walked 7.15   Final Partial Lap Distance (feet) 0 feet   Total Distance Feet (Calculated) 1430 feet   Total Distance Meters (Calculated) 435.86 meters   Predicted Distance Meters (Calculated) 489.83 meters   Percentage of Predicted (Calculated) 88.98   Peak VO2 (Calculated) 17.06   Mets 4.87   Comments This is a Non-Hypoxemic 6 min. walk. Patient did not qualify for Home Oxygen.   Oxygen Qualification   Oxygen Qualification? No     "

## 2019-10-31 NOTE — CARE UPDATE
10/31/19 1100   Critical Value Communication   Resulting Department of Critical Value   (I stat cartridge error/ ABG repeat needed. )   Doctor not notified of critical value due to: other (see comments)  (cartridge error)

## 2019-11-05 ENCOUNTER — OFFICE VISIT (OUTPATIENT)
Dept: PULMONOLOGY | Facility: CLINIC | Age: 74
End: 2019-11-05
Payer: MEDICARE

## 2019-11-05 VITALS
BODY MASS INDEX: 20.73 KG/M2 | SYSTOLIC BLOOD PRESSURE: 120 MMHG | WEIGHT: 129 LBS | HEIGHT: 66 IN | DIASTOLIC BLOOD PRESSURE: 70 MMHG | OXYGEN SATURATION: 99 % | HEART RATE: 86 BPM

## 2019-11-05 DIAGNOSIS — F02.818 LEWY BODY DEMENTIA WITH BEHAVIORAL DISTURBANCE: ICD-10-CM

## 2019-11-05 DIAGNOSIS — I51.89 DIASTOLIC DYSFUNCTION: ICD-10-CM

## 2019-11-05 DIAGNOSIS — G31.83 LEWY BODY DEMENTIA WITH BEHAVIORAL DISTURBANCE: ICD-10-CM

## 2019-11-05 DIAGNOSIS — F41.0 PANIC DISORDER: ICD-10-CM

## 2019-11-05 DIAGNOSIS — F45.8 HYPERVENTILATION SYNDROME: Primary | ICD-10-CM

## 2019-11-05 DIAGNOSIS — R06.02 SHORTNESS OF BREATH: ICD-10-CM

## 2019-11-05 PROCEDURE — G0008 FLU VACCINE - HIGH DOSE (65+) PRESERVATIVE FREE IM: ICD-10-PCS | Mod: S$GLB,,, | Performed by: INTERNAL MEDICINE

## 2019-11-05 PROCEDURE — 99213 PR OFFICE/OUTPT VISIT, EST, LEVL III, 20-29 MIN: ICD-10-PCS | Mod: 25,S$GLB,, | Performed by: INTERNAL MEDICINE

## 2019-11-05 PROCEDURE — 90662 IIV NO PRSV INCREASED AG IM: CPT | Mod: S$GLB,,, | Performed by: INTERNAL MEDICINE

## 2019-11-05 PROCEDURE — G0008 ADMIN INFLUENZA VIRUS VAC: HCPCS | Mod: S$GLB,,, | Performed by: INTERNAL MEDICINE

## 2019-11-05 PROCEDURE — 99213 OFFICE O/P EST LOW 20 MIN: CPT | Mod: 25,S$GLB,, | Performed by: INTERNAL MEDICINE

## 2019-11-05 PROCEDURE — 90662 FLU VACCINE - HIGH DOSE (65+) PRESERVATIVE FREE IM: ICD-10-PCS | Mod: S$GLB,,, | Performed by: INTERNAL MEDICINE

## 2019-11-05 NOTE — PROGRESS NOTES
[facility]  Pulmonology  Follow-Up Clinic Visit    Subjective:     Jonathon Pantoja is a 74 y.o. male with frontotemporal dementia, referred for evaluation of cryptogenic dyspnea and here for follow up after our initial visit.  Since our last visit he denies any subjective changes, and continues to experience intermittent transient episodes of dyspnea randomly throughout the day associated with overwhelming anxiety, panic, and a sense of impending doom.  Otherwise he now has no other respiratory symptoms.    He is not on home oxygen.  He is not currently smoking.    Review of Systems   Constitutional: Negative for fever, weight loss, weight gain, fatigue and weakness.   HENT: Negative for postnasal drip, rhinorrhea and sinus pressure.    Eyes: Negative for redness and itching.   Respiratory: Positive for shortness of breath. Negative for apnea, snoring, cough, hemoptysis, sputum production, choking, wheezing, orthopnea, previous hospitialization due to pulmonary problems, asthma nighttime symptoms, pleurisy, dyspnea on extertion, use of rescue inhaler, somnolence and Paroxysmal Nocturnal Dyspnea.    Cardiovascular: Negative for chest pain, palpitations and leg swelling.   Genitourinary: Negative for difficulty urinating and hematuria.   Endocrine: Negative for polydipsia, polyphagia and polyuria.    Musculoskeletal: Negative for gait problem, joint swelling and myalgias.   Skin: Negative for rash.   Gastrointestinal: Negative for nausea, vomiting, abdominal pain and acid reflux.   Neurological: Positive for dizziness. Negative for syncope, weakness and headaches.   Hematological: Negative for adenopathy. Does not bruise/bleed easily and no excessive bruising.   Psychiatric/Behavioral: Positive for sleep disturbance. Negative for confusion. The patient is nervous/anxious.    All other systems reviewed and are negative.     Medications:  Outpatient Medications as of 11/5/2019   Medication    aspirin  "(ECOTRIN) 81 MG EC tablet    atenolol (TENORMIN) 50 MG tablet    carbidopa-levodopa  mg (SINEMET)  mg per tablet    donepezil (ARICEPT) 10 MG tablet    irbesartan (AVAPRO) 300 MG tablet    melatonin 10 mg Tab    multivitamin (THERAGRAN) per tablet    pravastatin (PRAVACHOL) 40 MG tablet    ALPRAZolam (XANAX) 0.5 MG tablet     No current facility-administered medications on file as of 11/5/2019.       I have personally reviewed the following during today's visit:  active problem list, medication list, allergies, family history, social history, health maintenance, notes from last encounter, lab results, imaging, other providers documentation, pulmonary function tests, 6 min walk test, and recent echocardiogram.     Objective:        /70 (BP Location: Left arm, Patient Position: Sitting, BP Method: Medium (Manual))   Pulse 86   Ht 5' 6" (1.676 m)   Wt 58.5 kg (129 lb)   SpO2 99%   BMI 20.82 kg/m²     Physical Exam   Constitutional: He is oriented to person, place, and time. He appears well-developed and well-nourished. He appears not cachectic. No distress. He is not obese.   HENT:   Head: Normocephalic.   Right Ear: External ear normal.   Left Ear: External ear normal.   Nose: Nose normal. No mucosal edema.   Mouth/Throat: Oropharynx is clear and moist. Normal dentition. No oropharyngeal exudate. Mallampati Score: II.   Neck: Normal range of motion. Neck supple. No JVD present. No tracheal deviation present. No thyromegaly present.   Cardiovascular: Normal rate, regular rhythm, normal heart sounds and intact distal pulses. Exam reveals no gallop and no friction rub.   No murmur heard.  Pulmonary/Chest: Normal expansion, hyperinflation, symmetric chest wall expansion, effort normal and breath sounds normal. No stridor. No respiratory distress. He has no decreased breath sounds. He has no wheezes. He has no rhonchi. He has no rales. Chest wall is not dull to percussion. He exhibits no " tenderness. Negative for egophony. Negative for tactile fremitus.   Abdominal: Soft. Bowel sounds are normal. He exhibits no distension. There is no tenderness. There is no guarding.   Musculoskeletal: Normal range of motion. He exhibits no edema, tenderness or deformity.   Lymphadenopathy:     He has no cervical adenopathy.   Neurological: He is alert and oriented to person, place, and time. He has normal strength. He displays tremor. No cranial nerve deficit or sensory deficit. He exhibits abnormal muscle tone. Coordination and gait abnormal. GCS eye subscore is 4. GCS verbal subscore is 5. GCS motor subscore is 6.   Skin: Skin is warm and dry. No rash noted. He is not diaphoretic. No cyanosis or erythema. No pallor. Nails show no clubbing.   Psychiatric: He has a normal mood and affect. His behavior is normal. Judgment and thought content normal.   Nursing note and vitals reviewed.     Diagnostic Studies:  I have personally reviewed the following labs/studies/images.    Chest x-ray:   Date:  10/1/2019  Radiology Report::  No acute cardiac or pulmonary process.  My impression:  Unremarkable chest radiograph    2019  Radiology Report:  No acute pulmonary process.  My Impression:  Unremarkable chest radiograph.    I independently viewed the above imaging/lab studies in addition to reviewing the report     CT Chest:  None on file     PFTs:  Date:  2019  FEV1:  2.74  (103 % predicted), FVC:  3.34 (95 % predicted), FEV1/FVC:  82, T.03 (96 % predicted), RV/TLVC:  45 (105 % predicted), DLCO:  17.06 (77 % predicted),   No obstruction.  No restriction.  No diffusion impairment.    My impression:  Normal pulmonary function test.     Methacholine Challenge:  None on file.     6MWT:  Date:  2019  Predicted distance: 489 m feet, Actual distance: 435 m feet, HR: at rest S64 / during exercise 76 / recovery 74, BP:  at rest n/a / during exercise n/a / recovery n/a, SpO2:  at rest 96 / during  exercise 95 / recovery 96, Supplemental Oxygen:  RA% without / RA with RAL NC and Based on these results the patient does not qualify for home oxygen.     PSG:  None on file     ECG:  None on file     Echocardiogram:   Date:  10/11/2019  · Mild concentric left ventricular hypertrophy.  · Normal left ventricular systolic function. The estimated ejection fraction is 65%  · Grade I (mild) left ventricular diastolic dysfunction consistent with impaired relaxation.  · Normal right ventricular systolic function.  · Mild left atrial enlargement.  · Mild tricuspid regurgitation.  · No pulmonary hypertension present.     BNP  9/18/2018:  47.7      ABG   10/30/2019: 7.716 / 17.7 / 198/ 22.6    Assessment:       1. Hyperventilation syndrome    2. Shortness of breath    3. Lewy body dementia with behavioral disturbance    4. Diastolic dysfunction    5. Panic disorder        Impression:  No evidence of intrinsic cardiopulmonary dysfunction contributing to subjective symptoms.  Hyperventilation syndrome alone sufficiently explains presentation.    Plan:   · Discussed breathing exercieses to cope with acute episodes and also to prevent recurrence.  · Encouraged use of benzodiazepines as needed for refractory symptoms if necessary.  · If conscious breathing exercises are consistently insufficient, will consider referral to Cognitive Behavioral Therapy.  · Continue current medications without any adjustments..  · Administered in the office:  Influenza vaccine.  · I informed the patient of my working diagnosis, it's etiology, risk factors, expected symptoms, diagnostic work up, treatment options and prognosis., I personally reviewed the results of relevant imaging/labs/studies with the patient, and discussed their clinical significance., I spent >10 minutes counseling the patient about their condition., Plan discussed with the patient, who is in agreement., Opportunity provided for the the patient to voice any additional questions or  concerns., All questions were answered to the patient's satisfaction., Educational material provided and Patient given date for next visit.    Follow up in about 4 weeks (around 12/3/2019).    Brian Dalal MD  Pulmonary / Critical Care Medicine  [facility]  11/05/2019

## 2019-11-05 NOTE — PATIENT INSTRUCTIONS
Hyperventilation Syndrome  Hyperventilation syndrome is the medical term for losing control of your breathing. You may find yourself breathing too fast or too deeply. This can be triggered by pain, anxiety, or emotional stress. If hyperventilation continues for more than a few minutes, it can lead to a number of frightening symptoms, such as:  · Numbness and tingling of the hands, feet, and face  · Clenching of the fingers or toes  · Dizziness  · Feeling like you cannot get enough air  · Chest pains  · Fainting or feeling like you are going to faint  Once these symptoms begin, it is often hard to stop them, because they lead to a cycle of more anxiety and more hyperventilation. It is important to understand that this is not a life-threatening condition. It will pass once you are able to relax. Relaxation and stress-management techniques can be learned and practiced when you are not hyperventilating. These techniques can help in the event of a future attack.  Home care  Rest today until you feel back to normal. If symptoms return, take the following steps to care for yourself:  · Sit or lie down. Remember that what is happening to you is temporary and will pass.  · Use the relaxation methods you have learned.  Note: It is no longer recommended to breathe into a paper bag.  Follow-up care  Follow up with your healthcare provider, or as advised.  When to seek medical advice  Call your healthcare provider right away if any of these occur:  · Fever of 100.4ºF (38ºC) or higher  · Redness, pain, or swelling of the leg  · Ringing in your ears  · Severe headache  Call 911, or get immediate medical care  Contact emergency services right away if any of these occur:  · Weakness or fainting  · Increasing shortness of breath  · Coughing up blood  · Chest pain that is made worse with each breath  Date Last Reviewed: 9/13/2015  © 0321-9373 [employer]. [address] [place] [address], [place] [address]. All rights reserved.  This information is not intended as a substitute for professional medical care. Always follow your healthcare professional's instructions.      Understanding Hyperventilation Syndrome  When you breathe, you get oxygen from the air you inhale. You then let out carbon dioxide with the air you exhale. Hyperventilation syndrome is a pattern of breathing during which you breathe more quickly and deeply than normal. This can be very distressing to experience. If it goes on for some time, it can cause the level of carbon dioxide in the blood to get too low. This can lead to symptoms felt throughout the body.  What causes hyperventilation syndrome?  Hyperventilation syndrome may be caused from things such as:  · Anxiety or panic (most common)  · Pregnancy  · Bleeding  · Infection  · Certain heart and lung problems  Symptoms of hyperventilation syndrome  Symptoms can include:  · Fast or deep breathing  · Shortness of breath or the feeling that you cant get enough air  · Anxiety, fear, panic, or strong feeling of dread or doom  · Dizziness  · Chest pain or squeezing in the chest  · Fast, pounding, or skipping heartbeat  · Sweating  · Numbness or tingling around the mouth and in the fingers  · Muscle cramps in the hands or feet  Treatment for hyperventilation syndrome  Treatment is focused on getting your breathing rate and level of carbon dioxide in the blood back to normal. If you are being treated in a hospital or healthcare providers office, the following may be done:  · A healthcare provider may monitor the level of oxygen in your blood with a pulse oximeter.  · A nurse or other healthcare provider will talk with you and help you to stay calm.  · You may be asked to try various breathing exercises, such as pursed-lip breathing. This helps slow down your breathing. You may also be asked to hold your breath for short periods.  · If needed, you may be told to breathe into a tube.  · You may also be given medicine to help  you relax.  How can hyperventilation syndrome be prevented?  To help prevent further episodes in the future, you may be told to try:  · Breathing exercises  · Relaxation methods such as meditation or progressive muscle relaxation  · Regular exercise  · Counseling or medicines to help manage an anxiety or panic disorder  Possible complications of hyperventilation syndrome  If the level of carbon dioxide becomes dangerously low, this is called hypocapnia. It can upset the acid-base balance in the blood and cause problems such as fainting and seizures.  Other possible complications of hyperventilation syndrome will vary depending on the cause.  When to call your healthcare provider  Call your healthcare provider right away if you have any of these:  · Fever of 100.4°F (38°C) or higher, or as directed  · Symptoms that dont get better with treatment, or symptoms that get worse  · New symptoms   Date Last Reviewed: 5/1/2016  © 1839-7392 [employer]. [address] [place] [address], [place] [address]. All rights reserved. This information is not intended as a substitute for professional medical care. Always follow your healthcare professional's instructions.

## 2019-12-03 ENCOUNTER — OFFICE VISIT (OUTPATIENT)
Dept: PULMONOLOGY | Facility: CLINIC | Age: 74
End: 2019-12-03
Payer: MEDICARE

## 2019-12-03 VITALS
BODY MASS INDEX: 20.89 KG/M2 | HEART RATE: 72 BPM | HEIGHT: 66 IN | SYSTOLIC BLOOD PRESSURE: 170 MMHG | WEIGHT: 130 LBS | OXYGEN SATURATION: 99 % | DIASTOLIC BLOOD PRESSURE: 85 MMHG

## 2019-12-03 DIAGNOSIS — R06.02 SHORTNESS OF BREATH: ICD-10-CM

## 2019-12-03 DIAGNOSIS — G31.83 LEWY BODY DEMENTIA WITH BEHAVIORAL DISTURBANCE: ICD-10-CM

## 2019-12-03 DIAGNOSIS — F45.8 HYPERVENTILATION SYNDROME: Primary | ICD-10-CM

## 2019-12-03 DIAGNOSIS — F02.818 LEWY BODY DEMENTIA WITH BEHAVIORAL DISTURBANCE: ICD-10-CM

## 2019-12-03 DIAGNOSIS — I51.89 DIASTOLIC DYSFUNCTION: ICD-10-CM

## 2019-12-03 DIAGNOSIS — F41.0 PANIC DISORDER: ICD-10-CM

## 2019-12-03 PROCEDURE — 1159F PR MEDICATION LIST DOCUMENTED IN MEDICAL RECORD: ICD-10-PCS | Mod: S$GLB,,, | Performed by: INTERNAL MEDICINE

## 2019-12-03 PROCEDURE — 99213 OFFICE O/P EST LOW 20 MIN: CPT | Mod: S$GLB,,, | Performed by: INTERNAL MEDICINE

## 2019-12-03 PROCEDURE — 1159F MED LIST DOCD IN RCRD: CPT | Mod: S$GLB,,, | Performed by: INTERNAL MEDICINE

## 2019-12-03 PROCEDURE — 1126F PR PAIN SEVERITY QUANTIFIED, NO PAIN PRESENT: ICD-10-PCS | Mod: S$GLB,,, | Performed by: INTERNAL MEDICINE

## 2019-12-03 PROCEDURE — 1126F AMNT PAIN NOTED NONE PRSNT: CPT | Mod: S$GLB,,, | Performed by: INTERNAL MEDICINE

## 2019-12-03 PROCEDURE — 99213 PR OFFICE/OUTPT VISIT, EST, LEVL III, 20-29 MIN: ICD-10-PCS | Mod: S$GLB,,, | Performed by: INTERNAL MEDICINE

## 2019-12-03 NOTE — PATIENT INSTRUCTIONS
· Take clonazepam twice daily.  · I have referred you to a psychiatrist to help with managing your anixety.    Brian Dalal MD  12/03/2019  1:40 PM

## 2019-12-03 NOTE — PROGRESS NOTES
[facility]  Pulmonology  Follow-Up Clinic Visit    Subjective:     Reason for Visit:    Jonathon Pantoja is a 74 y.o. male followed for hyperventilation syndrome related to Lewy Body Dementia.    Chief Complaint   Patient presents with    Follow-up      No improvement with CBT.    Shortness of Breath   This is a chronic problem. The current episode started more than 1 month ago. The problem occurs daily. The problem has been gradually worsening. The average episode lasts 5 minutes. Pertinent negatives include no abdominal pain, chest pain, fever, headaches, hemoptysis, leg swelling, rash, rhinorrhea, sputum production, vomiting or wheezing. The symptoms are aggravated by emotional upset. The patient has no known risk factors for DVT/PE. Treatments tried: cognitive behavioral therapy. The treatment provided no relief. (Lewy Body Dementia)        Review of Systems   Constitutional: Negative for fever, weight loss, weight gain, fatigue and weakness.   HENT: Negative for postnasal drip, rhinorrhea and sinus pressure.    Eyes: Negative for redness and itching.   Respiratory: Positive for shortness of breath. Negative for apnea, snoring, cough, hemoptysis, sputum production, choking, wheezing, orthopnea, previous hospitialization due to pulmonary problems, asthma nighttime symptoms, pleurisy, dyspnea on extertion, use of rescue inhaler, somnolence and Paroxysmal Nocturnal Dyspnea.    Cardiovascular: Negative for chest pain, palpitations and leg swelling.   Genitourinary: Negative for difficulty urinating and hematuria.   Endocrine: Negative for polydipsia, polyphagia and polyuria.    Musculoskeletal: Negative for gait problem, joint swelling and myalgias.   Skin: Negative for rash.   Gastrointestinal: Negative for nausea, vomiting, abdominal pain and acid reflux.   Neurological: Positive for dizziness. Negative for syncope, weakness and headaches.   Hematological: Negative for adenopathy. Does not bruise/bleed  "easily and no excessive bruising.   Psychiatric/Behavioral: Positive for sleep disturbance. Negative for confusion. The patient is nervous/anxious.    All other systems reviewed and are negative.     Outpatient Medications as of 12/3/2019   Medication    ALPRAZolam (XANAX) 0.5 MG tablet    aspirin (ECOTRIN) 81 MG EC tablet    atenolol (TENORMIN) 50 MG tablet    carbidopa-levodopa  mg (SINEMET)  mg per tablet    donepezil (ARICEPT) 10 MG tablet    irbesartan (AVAPRO) 300 MG tablet    melatonin 10 mg Tab    multivitamin (THERAGRAN) per tablet    pravastatin (PRAVACHOL) 40 MG tablet     No current facility-administered medications on file as of 12/3/2019.       Previous Reports Reviewed:   historical medical records, lab reports, office notes, radiology reports, referral letter/letters and x-ray reports   The following portions of the patient's history were reviewed and updated as appropriate: allergies, current medications, past family history, past medical history, past social history, past surgical history and problem list.      Objective:     BP (!) 170/85 (BP Location: Left arm, Patient Position: Sitting, BP Method: Medium (Manual))   Pulse 72   Ht 5' 6" (1.676 m)   Wt 59 kg (130 lb)   SpO2 99%   BMI 20.98 kg/m²   Body mass index is 20.98 kg/m².     Physical Exam   Constitutional: He is oriented to person, place, and time. He appears well-developed and well-nourished. He appears not cachectic. No distress. He is not obese.   HENT:   Head: Normocephalic.   Right Ear: External ear normal.   Left Ear: External ear normal.   Nose: Nose normal. No mucosal edema.   Mouth/Throat: Oropharynx is clear and moist. Normal dentition. No oropharyngeal exudate. Mallampati Score: II.   Neck: Normal range of motion. Neck supple. No JVD present. No tracheal deviation present. No thyromegaly present.   Cardiovascular: Normal rate, regular rhythm, normal heart sounds and intact distal pulses. Exam reveals no " gallop and no friction rub.   No murmur heard.  Pulmonary/Chest: Normal expansion, hyperinflation, symmetric chest wall expansion, effort normal and breath sounds normal. No stridor. No respiratory distress. He has no decreased breath sounds. He has no wheezes. He has no rhonchi. He has no rales. Chest wall is not dull to percussion. He exhibits no tenderness. Negative for egophony. Negative for tactile fremitus.   Abdominal: Soft. Bowel sounds are normal. He exhibits no distension. There is no tenderness. There is no guarding.   Musculoskeletal: Normal range of motion. He exhibits no edema, tenderness or deformity.   Lymphadenopathy:     He has no cervical adenopathy.   Neurological: He is alert and oriented to person, place, and time. He has normal strength. He displays tremor. No cranial nerve deficit or sensory deficit. He exhibits abnormal muscle tone. Coordination and gait abnormal. GCS eye subscore is 4. GCS verbal subscore is 5. GCS motor subscore is 6.   Skin: Skin is warm and dry. No rash noted. He is not diaphoretic. No cyanosis or erythema. No pallor. Nails show no clubbing.   Psychiatric: He has a normal mood and affect. His behavior is normal. Judgment and thought content normal.   Nursing note and vitals reviewed.       Personal Review of Relevant Diagnostic Studies:  I have personally reviewed and interpreted the following labs/studies/images.  Chest x-ray:   Date:  10/1/2019  Radiology Report::  No acute cardiac or pulmonary process.  My impression:  Unremarkable chest radiograph     2019  Radiology Report:  No acute pulmonary process.  My Impression:  Unremarkable chest radiograph.     I independently viewed the above imaging/lab studies in addition to reviewing the report      CT Chest:  None on file      PFTs:  Date:  2019  FEV1:  2.74  (103 % predicted), FVC:  3.34 (95 % predicted), FEV1/FVC:  82, T.03 (96 % predicted), RV/TLVC:  45 (105 % predicted), DLCO:  17.06 (77 %  predicted),   No obstruction.  No restriction.  No diffusion impairment.     My impression:  Normal pulmonary function test.      Methacholine Challenge:  None on file.      6MWT:  Date:  October 30, 2019  Predicted distance: 489 m feet, Actual distance: 435 m feet, HR: at rest S64 / during exercise 76 / recovery 74, BP:  at rest n/a / during exercise n/a / recovery n/a, SpO2:  at rest 96 / during exercise 95 / recovery 96, Supplemental Oxygen:  RA% without / RA with RAL NC and Based on these results the patient does not qualify for home oxygen.      PSG:  None on file      ECG:  None on file      Echocardiogram:   Date:  10/11/2019  · Mild concentric left ventricular hypertrophy.  · Normal left ventricular systolic function. The estimated ejection fraction is 65%  · Grade I (mild) left ventricular diastolic dysfunction consistent with impaired relaxation.  · Normal right ventricular systolic function.  · Mild left atrial enlargement.  · Mild tricuspid regurgitation.  · No pulmonary hypertension present.      BNP  9/18/2018:  47.7                     ABG                 10/30/2019:          7.716 / 17.7 / 198/ 22.6     No new studies      Assessment:     1. Hyperventilation syndrome    2. Shortness of breath    3. Lewy body dementia with behavioral disturbance    4. Diastolic dysfunction    5. Panic disorder      Impression:  No improvement with CBT.      Plan:     · Start low dose/scheduled clonazepam.  · Refer to Geriatric Psychiatry.     I informed the patient of my working diagnosis, it's etiology, risk factors, expected symptoms, diagnostic work up, treatment options and prognosis., I personally reviewed the results of relevant imaging/labs/studies with the patient, and discussed their clinical significance., I spent >10 minutes counseling the patient about their condition., Plan discussed with the patient, who is in agreement., Opportunity provided for the the patient to voice any additional questions or  concerns., All questions were answered to the patient's satisfaction., Educational material provided and Patient given date for next visit.    Follow up in about 4 weeks (around 12/31/2019).    Brian Dalal MD  Pulmonary / Critical Care Medicine  [facility]    Orders Placed This Visit:  Orders Placed This Encounter   Procedures    Ambulatory Referral to Psychiatry     Referral Priority:   Routine     Referral Type:   Psychiatric     Referral Reason:   Specialty Services Required     Referred to Provider:   Florian Alejandro MD     Requested Specialty:   Psychiatry     Number of Visits Requested:   1   · Paper prescription for clonazepam 0.5 mg BID.

## 2019-12-12 ENCOUNTER — TELEPHONE (OUTPATIENT)
Dept: PULMONOLOGY | Facility: CLINIC | Age: 74
End: 2019-12-12

## 2019-12-12 NOTE — TELEPHONE ENCOUNTER
Pts wife called and said Dr Albarado is not taking any new patients at this time who else do you recommend

## 2019-12-13 NOTE — TELEPHONE ENCOUNTER
Pts wife wanted us to know that she has seen an improvement in her  since starting the clonazepam 0.5mg BID

## 2019-12-31 ENCOUNTER — OFFICE VISIT (OUTPATIENT)
Dept: PULMONOLOGY | Facility: CLINIC | Age: 74
End: 2019-12-31
Payer: MEDICARE

## 2019-12-31 VITALS
DIASTOLIC BLOOD PRESSURE: 83 MMHG | SYSTOLIC BLOOD PRESSURE: 162 MMHG | HEART RATE: 82 BPM | BODY MASS INDEX: 20.41 KG/M2 | WEIGHT: 127 LBS | HEIGHT: 66 IN

## 2019-12-31 DIAGNOSIS — F02.818 LEWY BODY DEMENTIA WITH BEHAVIORAL DISTURBANCE: ICD-10-CM

## 2019-12-31 DIAGNOSIS — R06.02 SHORTNESS OF BREATH: Primary | ICD-10-CM

## 2019-12-31 DIAGNOSIS — F45.8 HYPERVENTILATION SYNDROME: ICD-10-CM

## 2019-12-31 DIAGNOSIS — G31.83 LEWY BODY DEMENTIA WITH BEHAVIORAL DISTURBANCE: ICD-10-CM

## 2019-12-31 DIAGNOSIS — F41.0 PANIC DISORDER: ICD-10-CM

## 2019-12-31 PROCEDURE — 1159F MED LIST DOCD IN RCRD: CPT | Mod: S$GLB,,, | Performed by: INTERNAL MEDICINE

## 2019-12-31 PROCEDURE — 99213 PR OFFICE/OUTPT VISIT, EST, LEVL III, 20-29 MIN: ICD-10-PCS | Mod: S$GLB,,, | Performed by: INTERNAL MEDICINE

## 2019-12-31 PROCEDURE — 1125F PR PAIN SEVERITY QUANTIFIED, PAIN PRESENT: ICD-10-PCS | Mod: S$GLB,,, | Performed by: INTERNAL MEDICINE

## 2019-12-31 PROCEDURE — 1159F PR MEDICATION LIST DOCUMENTED IN MEDICAL RECORD: ICD-10-PCS | Mod: S$GLB,,, | Performed by: INTERNAL MEDICINE

## 2019-12-31 PROCEDURE — 99213 OFFICE O/P EST LOW 20 MIN: CPT | Mod: S$GLB,,, | Performed by: INTERNAL MEDICINE

## 2019-12-31 PROCEDURE — 1125F AMNT PAIN NOTED PAIN PRSNT: CPT | Mod: S$GLB,,, | Performed by: INTERNAL MEDICINE

## 2019-12-31 RX ORDER — CLONAZEPAM 0.5 MG/1
0.5 TABLET ORAL 2 TIMES DAILY
COMMUNITY
Start: 2019-12-03 | End: 2020-04-21

## 2019-12-31 NOTE — PATIENT INSTRUCTIONS
Clonazepam tablets  What is this medicine?  CLONAZEPAM (kloe NA ze kolton) is a benzodiazepine. It is used to treat certain types of seizures. It is also used to treat panic disorder.  How should I use this medicine?  Take this medicine by mouth with a glass of water. Follow the directions on the prescription label. If it upsets your stomach, take it with food or milk. Take your medicine at regular intervals. Do not take it more often than directed. Do not stop taking or change the dose except on the advice of your doctor or health care professional.  A special MedGuide will be given to you by the pharmacist with each prescription and refill. Be sure to read this information carefully each time.  Talk to your pediatrician regarding the use of this medicine in children. Special care may be needed.  What side effects may I notice from receiving this medicine?  Side effects that you should report to your doctor or health care professional as soon as possible:  · allergic reactions like skin rash, itching or hives, swelling of the face, lips, or tongue  · breathing problems  · confusion  · loss of balance or coordination  · signs and symptoms of low blood pressure like dizziness; feeling faint or lightheaded, falls; unusually weak or tired  · suicidal thoughts or mood changes  Side effects that usually do not require medical attention (report to your doctor or health care professional if they continue or are bothersome):  · dizziness  · headache  · tiredness  · upset stomach  What may interact with this medicine?  Do not take this medication with any of the following medicines:  · narcotic medicines for cough  · sodium oxybate  This medicine may also interact with the following medications:  · alcohol  · antihistamines for allergy, cough and cold  · antiviral medicines for HIV or AIDS  · certain medicines for anxiety or sleep  · certain medicines for depression, like amitriptyline, fluoxetine, sertraline  · certain  medicines for fungal infections like ketoconazole and itraconazole  · certain medicines for seizures like carbamazepine, phenobarbital, phenytoin, primidone  · general anesthetics like halothane, isoflurane, methoxyflurane, propofol  · local anesthetics like lidocaine, pramoxine, tetracaine  · medicines that relax muscles for surgery  · narcotic medicines for pain  · phenothiazines like chlorpromazine, mesoridazine, prochlorperazine, thioridazine  What if I miss a dose?  If you miss a dose, take it as soon as you can. If it is almost time for your next dose, take only that dose. Do not take double or extra doses.  Where should I keep my medicine?  Keep out of the reach of children. This medicine can be abused. Keep your medicine in a safe place to protect it from theft. Do not share this medicine with anyone. Selling or giving away this medicine is dangerous and against the law.  This medicine may cause accidental overdose and death if taken by other adults, children, or pets. Mix any unused medicine with a substance like cat litter or coffee grounds. Then throw the medicine away in a sealed container like a sealed bag or a coffee can with a lid. Do not use the medicine after the expiration date.   Store at room temperature between 15 and 30 degrees C (59 and 86 degrees F). Protect from light. Keep container tightly closed.  What should I tell my health care provider before I take this medicine?  They need to know if you have any of these conditions:  · an alcohol or drug abuse problem  · bipolar disorder, depression, psychosis or other mental health condition  · glaucoma  · kidney or liver disease  · lung or breathing disease  · myasthenia gravis  · Parkinson's disease  · porphyria  · seizures or a history of seizures  · suicidal thoughts  · an unusual or allergic reaction to clonazepam, other benzodiazepines, foods, dyes, or preservatives  · pregnant or trying to get pregnant  · breast-feeding  What should I watch  for while using this medicine?  Tell your doctor or health care professional if your symptoms do not start to get better or if they get worse.  Do not stop taking except on your doctor's advice. You may develop a severe reaction. Your doctor will tell you how much medicine to take.  You may get drowsy or dizzy. Do not drive, use machinery, or do anything that needs mental alertness until you know how this medicine affects you. To reduce the risk of dizzy and fainting spells, do not stand or sit up quickly, especially if you are an older patient. Alcohol may increase dizziness and drowsiness. Avoid alcoholic drinks.  If you are taking another medicine that also causes drowsiness, you may have more side effects. Give your health care provider a list of all medicines you use. Your doctor will tell you how much medicine to take. Do not take more medicine than directed. Call emergency for help if you have problems breathing or unusual sleepiness.  NOTE:This sheet is a summary. It may not cover all possible information. If you have questions about this medicine, talk to your doctor, pharmacist, or health care provider. Copyright© 2017 [employer]

## 2019-12-31 NOTE — PROGRESS NOTES
[facility]  Pulmonology  Follow-Up Clinic Visit    Subjective:     Reason for Visit:    Jonathon Pantoja is a 74 y.o. male followed for hyperventilation syndrome secondary to Lewy-Body Dementia .    Chief Complaint   Patient presents with    Follow-up     SOB, hyperventilation syndrome      Experiencing some relief with clonazepam but still with a fair amount of anxiety.  No new symptoms.    Follow-up   Pertinent negatives include no abdominal pain, chest pain, coughing, fatigue, fever, headaches, joint swelling, myalgias, nausea, rash, vomiting or weakness.      Review of Systems   Constitutional: Negative for fever, weight loss, weight gain, fatigue and weakness.   HENT: Negative for postnasal drip, rhinorrhea and sinus pressure.    Eyes: Negative for redness and itching.   Respiratory: Positive for shortness of breath. Negative for apnea, snoring, cough, hemoptysis, sputum production, choking, wheezing, orthopnea, previous hospitialization due to pulmonary problems, asthma nighttime symptoms, pleurisy, dyspnea on extertion, use of rescue inhaler, somnolence and Paroxysmal Nocturnal Dyspnea.    Cardiovascular: Negative for chest pain, palpitations and leg swelling.   Genitourinary: Negative for difficulty urinating and hematuria.   Endocrine: Negative for polydipsia, polyphagia and polyuria.    Musculoskeletal: Negative for gait problem, joint swelling and myalgias.   Skin: Negative for rash.   Gastrointestinal: Negative for nausea, vomiting, abdominal pain and acid reflux.   Neurological: Positive for dizziness. Negative for syncope, weakness and headaches.   Hematological: Negative for adenopathy. Does not bruise/bleed easily and no excessive bruising.   Psychiatric/Behavioral: Positive for sleep disturbance. Negative for confusion. The patient is nervous/anxious.    All other systems reviewed and are negative.     Outpatient Medications as of 12/31/2019   Medication    aspirin (ECOTRIN) 81 MG EC  "tablet    atenolol (TENORMIN) 50 MG tablet    carbidopa-levodopa  mg (SINEMET)  mg per tablet    clonazePAM (KLONOPIN) 0.5 MG tablet    donepezil (ARICEPT) 10 MG tablet    irbesartan (AVAPRO) 300 MG tablet    melatonin 10 mg Tab    multivitamin (THERAGRAN) per tablet    pravastatin (PRAVACHOL) 40 MG tablet    ALPRAZolam (XANAX) 0.5 MG tablet     No current facility-administered medications on file as of 12/31/2019.       Previous Reports Reviewed:   historical medical records, lab reports, nursing home notes, office notes, operative reports, radiology reports, referral letter/letters and x-ray reports   The following portions of the patient's history were reviewed and updated as appropriate: allergies, current medications, past family history, past medical history, past social history, past surgical history and problem list.      Objective:     BP (!) 162/83 (BP Location: Left arm, Patient Position: Sitting, BP Method: Small (Automatic))   Pulse 82   Ht 5' 6" (1.676 m)   Wt 57.6 kg (127 lb)   BMI 20.50 kg/m²   Body mass index is 20.5 kg/m².     Physical Exam   Constitutional: He is oriented to person, place, and time. He appears well-developed and well-nourished. He appears not cachectic. No distress. He is not obese.   HENT:   Head: Normocephalic.   Right Ear: External ear normal.   Left Ear: External ear normal.   Nose: Nose normal. No mucosal edema.   Mouth/Throat: Oropharynx is clear and moist. Normal dentition. No oropharyngeal exudate. Mallampati Score: II.   Neck: Normal range of motion. Neck supple. No JVD present. No tracheal deviation present. No thyromegaly present.   Cardiovascular: Normal rate, regular rhythm, normal heart sounds and intact distal pulses. Exam reveals no gallop and no friction rub.   No murmur heard.  Pulmonary/Chest: Normal expansion, hyperinflation, symmetric chest wall expansion, effort normal and breath sounds normal. No stridor. No respiratory distress. He " has no decreased breath sounds. He has no wheezes. He has no rhonchi. He has no rales. Chest wall is not dull to percussion. He exhibits no tenderness. Negative for egophony. Negative for tactile fremitus.   Abdominal: Soft. Bowel sounds are normal. He exhibits no distension. There is no tenderness. There is no guarding.   Musculoskeletal: Normal range of motion. He exhibits no edema, tenderness or deformity.   Lymphadenopathy:     He has no cervical adenopathy.   Neurological: He is alert and oriented to person, place, and time. He has normal strength. He displays tremor. No cranial nerve deficit or sensory deficit. He exhibits abnormal muscle tone. Coordination and gait abnormal. GCS eye subscore is 4. GCS verbal subscore is 5. GCS motor subscore is 6.   Skin: Skin is warm and dry. No rash noted. He is not diaphoretic. No cyanosis or erythema. No pallor. Nails show no clubbing.   Psychiatric: He has a normal mood and affect. His behavior is normal. Judgment and thought content normal.   Nursing note and vitals reviewed.       Personal Review of Relevant Diagnostic Studies:  I have personally reviewed and interpreted the following labs/studies/images.   No new studies.  Chest x-ray:   Date:  October 1, 2019  Radiology Report::    No acute cardiac or pulmonary process.  My impression:    Normal chest x-ray     September 20, 2019  Radiology report:  No acute pulmonary process.  My impression:  Normal chest x-ray.     I independently viewed the above imaging/lab studies in addition to reviewing the report      CT Chest:  None on file.      PFTs:  None on file.      Methacholine Challenge:  None on file.      6MWT:  None on file.      PSG:  None on file.      ECG:  None on file.      Echocardiogram:   Date:  October 11, 2019  · Mild concentric left ventricular hypertrophy.  · Normal left ventricular systolic function. The estimated ejection fraction is 65%  · Grade I (mild) left ventricular diastolic dysfunction  consistent with impaired relaxation.  · Normal right ventricular systolic function.  · Mild left atrial enlargement.  · Mild tricuspid regurgitation.  · No pulmonary hypertension present.      BNP  Date:  9/18/2018  47.7        Assessment:     1. Shortness of breath    2. Hyperventilation syndrome    3. Lewy body dementia with behavioral disturbance    4. Panic disorder      Impression:  Some transient improvement with clonazepam.  Still hasn't been able to find a psychiatrist.      Plan:     · Continue clonazepam.  · Will continue to work on arranging psychiatry consultation.   · Unfortunately, with the holiday, I am having trouble contacting an open office.     I informed the patient of my working diagnosis, it's etiology, risk factors, expected symptoms, diagnostic work up, treatment options and prognosis., I personally reviewed the results of relevant imaging/labs/studies with the patient, and discussed their clinical significance., I spent >10 minutes counseling the patient about their condition., Plan discussed with the patient, who is in agreement., Opportunity provided for the the patient to voice any additional questions or concerns., All questions were answered to the patient's satisfaction., Educational material provided and Patient given date for next visit.    No follow-ups on file.    Orders Placed This Visit:  No orders of the defined types were placed in this encounter.      Brian Dalal MD  Pulmonary / Critical Care Medicine  [facility]

## 2020-02-27 ENCOUNTER — OFFICE VISIT (OUTPATIENT)
Dept: PULMONOLOGY | Facility: CLINIC | Age: 75
End: 2020-02-27
Payer: MEDICARE

## 2020-02-27 VITALS
OXYGEN SATURATION: 98 % | HEART RATE: 58 BPM | DIASTOLIC BLOOD PRESSURE: 70 MMHG | HEIGHT: 66 IN | SYSTOLIC BLOOD PRESSURE: 100 MMHG | WEIGHT: 116 LBS | BODY MASS INDEX: 18.64 KG/M2

## 2020-02-27 DIAGNOSIS — R06.02 SHORTNESS OF BREATH: ICD-10-CM

## 2020-02-27 DIAGNOSIS — F45.8 HYPERVENTILATION SYNDROME: Primary | ICD-10-CM

## 2020-02-27 DIAGNOSIS — I51.89 DIASTOLIC DYSFUNCTION: ICD-10-CM

## 2020-02-27 DIAGNOSIS — F02.818 LEWY BODY DEMENTIA WITH BEHAVIORAL DISTURBANCE: ICD-10-CM

## 2020-02-27 DIAGNOSIS — F41.0 PANIC DISORDER: ICD-10-CM

## 2020-02-27 DIAGNOSIS — G31.83 LEWY BODY DEMENTIA WITH BEHAVIORAL DISTURBANCE: ICD-10-CM

## 2020-02-27 PROCEDURE — 99213 OFFICE O/P EST LOW 20 MIN: CPT | Mod: S$GLB,,, | Performed by: INTERNAL MEDICINE

## 2020-02-27 PROCEDURE — 99213 PR OFFICE/OUTPT VISIT, EST, LEVL III, 20-29 MIN: ICD-10-PCS | Mod: S$GLB,,, | Performed by: INTERNAL MEDICINE

## 2020-02-27 NOTE — PROGRESS NOTES
[facility]  Pulmonology  Follow-Up Clinic Visit    Subjective:     Reason for Visit:    Jonathon Pantoja is a 74 y.o. male followed for hyperventilation syndrome secondary to lewy body dementia.    Chief Complaint   Patient presents with    Follow-up    Shortness of Breath      02/27/2020:  No significant changes since our last visit.  Unable to find a psychiatrist taking new patients.  Some incomplete relief with Clonazepam.  Wife does not feel that he is overly sedated and has not noticed any side effects.  No new symptoms aside from panic attacks 1-2 times daily.     Review of Systems   Constitutional: Negative for fever, weight loss, weight gain, fatigue and weakness.   HENT: Negative for postnasal drip, rhinorrhea and sinus pressure.    Eyes: Negative for redness and itching.   Respiratory: Positive for shortness of breath. Negative for apnea, snoring, cough, hemoptysis, sputum production, choking, wheezing, orthopnea, previous hospitialization due to pulmonary problems, asthma nighttime symptoms, pleurisy, dyspnea on extertion, use of rescue inhaler, somnolence and Paroxysmal Nocturnal Dyspnea.    Cardiovascular: Negative for chest pain, palpitations and leg swelling.   Genitourinary: Negative for difficulty urinating and hematuria.   Endocrine: Negative for polydipsia, polyphagia and polyuria.    Musculoskeletal: Negative for gait problem, joint swelling and myalgias.   Skin: Negative for rash.   Gastrointestinal: Negative for nausea, vomiting, abdominal pain and acid reflux.   Neurological: Positive for dizziness. Negative for syncope, weakness and headaches.   Hematological: Negative for adenopathy. Does not bruise/bleed easily and no excessive bruising.   Psychiatric/Behavioral: Positive for sleep disturbance. Negative for confusion. The patient is nervous/anxious.    All other systems reviewed and are negative.     Outpatient Medications as of 2/27/2020   Medication    aspirin (ECOTRIN) 81  "MG EC tablet    atenolol (TENORMIN) 50 MG tablet    carbidopa-levodopa  mg (SINEMET)  mg per tablet    clonazePAM (KLONOPIN) 0.5 MG tablet    donepezil (ARICEPT) 10 MG tablet    irbesartan (AVAPRO) 300 MG tablet    melatonin 10 mg Tab    multivitamin (THERAGRAN) per tablet    pravastatin (PRAVACHOL) 40 MG tablet    ALPRAZolam (XANAX) 0.5 MG tablet     No current facility-administered medications on file as of 2/27/2020.       Previous Reports Reviewed:   ER records, historical medical records, lab reports, nursing home notes, office notes, operative reports, radiology reports, referral letter/letters and x-ray reports   The following portions of the patient's history were reviewed and updated as appropriate: allergies, current medications, past family history, past medical history, past social history, past surgical history and problem list.      Objective:     /70 (BP Location: Left arm, Patient Position: Sitting, BP Method: Medium (Manual))   Pulse (!) 58   Ht 5' 6" (1.676 m)   Wt 52.6 kg (116 lb)   SpO2 98%   BMI 18.72 kg/m²   Body mass index is 18.72 kg/m².     Physical Exam     Personal Review of Relevant Diagnostic Studies:  I have personally reviewed and interpreted the following labs/studies/images.  Chest x-ray:   Date:  October 1, 2019  Radiology Report::    No acute cardiac or pulmonary process.  My impression:    Normal chest x-ray     September 20, 2019  Radiology report:  No acute pulmonary process.  My impression:  Normal chest x-ray.     I independently viewed the above imaging/lab studies in addition to reviewing the report      CT Chest:  None on file.      PFTs:  None on file.      Methacholine Challenge:  None on file.      6MWT:  None on file.      PSG:  None on file.      ECG:  None on file.      Echocardiogram:   Date:  October 11, 2019  · Mild concentric left ventricular hypertrophy.  · Normal left ventricular systolic function. The estimated ejection fraction " is 65%  · Grade I (mild) left ventricular diastolic dysfunction consistent with impaired relaxation.  · Normal right ventricular systolic function.  · Mild left atrial enlargement.  · Mild tricuspid regurgitation.  · No pulmonary hypertension present.      BNP  Date:  9/18/2018  47.7         Assessment:     1. Hyperventilation syndrome    2. Shortness of breath    3. Lewy body dementia with behavioral disturbance    4. Panic disorder    5. Diastolic dysfunction      Impression:  No major changes but still having panic attacks and hyperventilation      Plan:     · Increase clonazepam to 1 mg BID.  · Attempts to obtain a psychiatric evaluation have been futile.     I informed the patient of my working diagnosis, it's etiology, risk factors, expected symptoms, diagnostic work up, treatment options and prognosis., I personally reviewed the results of relevant imaging/labs/studies with the patient, and discussed their clinical significance., I spent >10 minutes counseling the patient about their condition., Plan discussed with the patient, who is in agreement., Opportunity provided for the the patient to voice any additional questions or concerns., All questions were answered to the patient's satisfaction., Educational material provided and Patient given date for next visit.    No follow-ups on file.    Orders Placed This Visit:  No orders of the defined types were placed in this encounter.      Brian Dalal MD  Pulmonary / Critical Care Medicine  [facility]

## 2020-03-02 DIAGNOSIS — I10 ESSENTIAL HYPERTENSION: ICD-10-CM

## 2020-03-03 RX ORDER — IRBESARTAN 300 MG/1
TABLET ORAL
Qty: 30 TABLET | Refills: 0 | Status: SHIPPED | OUTPATIENT
Start: 2020-03-03 | End: 2020-03-30

## 2020-03-29 DIAGNOSIS — E78.01 FAMILIAL HYPERCHOLESTEROLEMIA: ICD-10-CM

## 2020-03-29 RX ORDER — PRAVASTATIN SODIUM 40 MG/1
TABLET ORAL
Qty: 90 TABLET | Refills: 3 | Status: SHIPPED | OUTPATIENT
Start: 2020-03-29 | End: 2020-10-15 | Stop reason: SDUPTHER

## 2020-03-30 DIAGNOSIS — I10 ESSENTIAL HYPERTENSION: ICD-10-CM

## 2020-03-30 RX ORDER — IRBESARTAN 300 MG/1
TABLET ORAL
Qty: 90 TABLET | Refills: 0 | Status: SHIPPED | OUTPATIENT
Start: 2020-03-30 | End: 2020-04-01 | Stop reason: SDUPTHER

## 2020-03-31 RX ORDER — IRBESARTAN 300 MG/1
300 TABLET ORAL NIGHTLY
Qty: 90 TABLET | Refills: 0 | Status: CANCELLED | OUTPATIENT
Start: 2020-03-31

## 2020-04-01 ENCOUNTER — PATIENT MESSAGE (OUTPATIENT)
Dept: FAMILY MEDICINE | Facility: CLINIC | Age: 75
End: 2020-04-01

## 2020-04-01 DIAGNOSIS — I10 ESSENTIAL HYPERTENSION: ICD-10-CM

## 2020-04-02 RX ORDER — IRBESARTAN 300 MG/1
300 TABLET ORAL NIGHTLY
Qty: 90 TABLET | Refills: 1 | Status: SHIPPED | OUTPATIENT
Start: 2020-04-02 | End: 2020-10-15 | Stop reason: SDUPTHER

## 2020-04-07 ENCOUNTER — PATIENT MESSAGE (OUTPATIENT)
Dept: FAMILY MEDICINE | Facility: CLINIC | Age: 75
End: 2020-04-07

## 2020-04-07 DIAGNOSIS — E78.5 HYPERLIPIDEMIA, UNSPECIFIED HYPERLIPIDEMIA TYPE: ICD-10-CM

## 2020-04-07 DIAGNOSIS — I10 ESSENTIAL HYPERTENSION: Primary | ICD-10-CM

## 2020-04-15 LAB
ALBUMIN SERPL-MCNC: 3.9 G/DL (ref 3.7–4.7)
ALBUMIN/GLOB SERPL: 2 {RATIO} (ref 1.2–2.2)
ALP SERPL-CCNC: 71 IU/L (ref 39–117)
ALT SERPL-CCNC: 15 IU/L (ref 0–44)
AST SERPL-CCNC: 19 IU/L (ref 0–40)
BILIRUB SERPL-MCNC: 0.7 MG/DL (ref 0–1.2)
BUN SERPL-MCNC: 12 MG/DL (ref 8–27)
BUN/CREAT SERPL: 16 (ref 10–24)
CALCIUM SERPL-MCNC: 9.1 MG/DL (ref 8.6–10.2)
CHLORIDE SERPL-SCNC: 105 MMOL/L (ref 96–106)
CHOLEST SERPL-MCNC: 125 MG/DL (ref 100–199)
CO2 SERPL-SCNC: 22 MMOL/L (ref 20–29)
CREAT SERPL-MCNC: 0.74 MG/DL (ref 0.76–1.27)
GLOBULIN SER CALC-MCNC: 2 G/DL (ref 1.5–4.5)
GLUCOSE SERPL-MCNC: 113 MG/DL (ref 65–99)
HDLC SERPL-MCNC: 57 MG/DL
LDLC SERPL CALC-MCNC: 59 MG/DL (ref 0–99)
POTASSIUM SERPL-SCNC: 4.5 MMOL/L (ref 3.5–5.2)
PROT SERPL-MCNC: 5.9 G/DL (ref 6–8.5)
SODIUM SERPL-SCNC: 144 MMOL/L (ref 134–144)
TRIGL SERPL-MCNC: 45 MG/DL (ref 0–149)
VLDLC SERPL CALC-MCNC: 9 MG/DL (ref 5–40)

## 2020-04-21 ENCOUNTER — OFFICE VISIT (OUTPATIENT)
Dept: FAMILY MEDICINE | Facility: CLINIC | Age: 75
End: 2020-04-21
Payer: MEDICARE

## 2020-04-21 DIAGNOSIS — G20.A1 PARKINSON'S DISEASE: ICD-10-CM

## 2020-04-21 DIAGNOSIS — F41.9 CHRONIC ANXIETY: Primary | ICD-10-CM

## 2020-04-21 DIAGNOSIS — R41.3 MEMORY LOSS, LONG TERM: ICD-10-CM

## 2020-04-21 PROCEDURE — 99212 PR OFFICE/OUTPT VISIT, EST, LEVL II, 10-19 MIN: ICD-10-PCS | Mod: 95,,, | Performed by: FAMILY MEDICINE

## 2020-04-21 PROCEDURE — 99212 OFFICE O/P EST SF 10 MIN: CPT | Mod: 95,,, | Performed by: FAMILY MEDICINE

## 2020-04-21 RX ORDER — ALPRAZOLAM 0.5 MG/1
1 TABLET ORAL 3 TIMES DAILY PRN
Qty: 270 TABLET | Refills: 1 | Status: SHIPPED | OUTPATIENT
Start: 2020-04-21 | End: 2020-10-15 | Stop reason: SDUPTHER

## 2020-04-21 NOTE — PROGRESS NOTES
Subjective:       Patient ID: Jonathon Pantoja is a 74 y.o. male.    Chief Complaint: No chief complaint on file.    ?    Subjective:      The chief complaint leading to consultation is: poor appetite , memory changes  The patient location is:  Home  Visit type: Virtual visit with synchronous audio/video or audio only  This was a video visit in lieu of in-person visit due to the coronavirus emergency. Patient acknowledged and consented to the video visit encounter.     @[facility]@    Past Surgical History:  No date: INGUINAL HERNIA REPAIR; Bilateral  Past Medical History:  No date: Abnormal fasting glucose  No date: Hyperlipidemia  No date: Hypertension  No date: Parkinson's disease  Review of patient's family history indicates:  Problem: Alzheimer's disease      Relation: Mother          Age of Onset: 94  Problem: Heart attack      Relation: Father          Age of Onset: 50  Problem: Dementia      Relation: Brother          Age of Onset: 74  Problem: Heart attack      Relation: Brother          Age of Onset: 50       Social History:   Marital Status:   Alcohol History:  reports that he drinks about 2.0 standard drinks of alcohol per week.  Tobacco History:  reports that he quit smoking about 25 years ago. His smoking use included cigarettes. He started smoking about 50 years ago. He has a 50.00 pack-year smoking history. He has never used smokeless tobacco.  Drug History:  reports that he does not use drugs.    Review of patient's allergies indicates:  No Known Allergies    Current Outpatient Medications:  ALPRAZolam (XANAX) 0.5 MG tablet, Take 1 tablet (0.5 mg total) by mouth 3 (three) times daily., Disp: 60 tablet, Rfl: 0  aspirin (ECOTRIN) 81 MG EC tablet, Take 1 tablet by mouth once daily., Disp: , Rfl:   atenolol (TENORMIN) 50 MG tablet, Take 1 tablet (50 mg total) by mouth once daily., Disp: 90 tablet, Rfl: 1  carbidopa-levodopa  mg (SINEMET)  mg per tablet, Take 1 tablet by mouth 4 (four)  times daily. , Disp: , Rfl:   donepezil (ARICEPT) 10 MG tablet, Take 1 tablet by mouth once daily., Disp: , Rfl:   irbesartan (AVAPRO) 300 MG tablet, Take 1 tablet (300 mg total) by mouth every evening., Disp: 90 tablet, Rfl: 1  melatonin 10 mg Tab, Take 1 tablet by mouth every evening., Disp: , Rfl:   multivitamin (THERAGRAN) per tablet, Take 1 tablet by mouth once daily., Disp: , Rfl:   pravastatin (PRAVACHOL) 40 MG tablet, TAKE 1 TABLET DAILY, Disp: 90 tablet, Rfl: 3    No current facility-administered medications for this visit.       [unfilled]     Objective:      Physical Exam:   [unfilled]        Assessment:     Chronic anxiety  Plan:   Chronic anxiety    No follow-ups on file.    Total time spent with patient: 7 minutes    Each patient to whom he or she provides medical services by telemedicine is:  (1) informed of the relationship between the physician and patient and the respective role of any other health care provider with respect to management of the patient; and (2) notified that he or she may decline to receive medical services by telemedicine and may withdraw from such care at any time.    This note was created using [employer] voice recognition software that occasionally misinterprets phrases or words.       Hypertension   This is a chronic problem. The current episode started more than 1 year ago. The problem is unchanged. The problem is controlled. Associated symptoms include anxiety, orthopnea and shortness of breath. Pertinent negatives include no blurred vision, chest pain, headaches, malaise/fatigue, neck pain, palpitations, peripheral edema, PND or sweats. There are no associated agents to hypertension. Risk factors for coronary artery disease include dyslipidemia and family history. There are no compliance problems.      Review of Systems   Constitutional: Positive for appetite change. Negative for activity change and malaise/fatigue.   Eyes: Negative for blurred vision.   Respiratory: Positive  for shortness of breath.    Cardiovascular: Positive for orthopnea. Negative for chest pain, palpitations and PND.   Gastrointestinal: Positive for constipation (mild).   Musculoskeletal: Negative for neck pain.   Neurological: Negative for headaches.   Psychiatric/Behavioral: Negative for agitation, confusion, dysphoric mood, self-injury and suicidal ideas.       Past Medical History:   Diagnosis Date    Abnormal fasting glucose     Hyperlipidemia     Hypertension     Parkinson's disease       Past Surgical History:   Procedure Laterality Date    INGUINAL HERNIA REPAIR Bilateral        Family History   Problem Relation Age of Onset    Alzheimer's disease Mother 94    Heart attack Father 50    Dementia Brother 74    Heart attack Brother 50       Social History     Socioeconomic History    Marital status:      Spouse name: Eleanor    Number of children: 2    Years of education: Not on file    Highest education level: Not on file   Occupational History    Occupation: retired   Social Needs    Financial resource strain: Not hard at all    Food insecurity:     Worry: Never true     Inability: Never true    Transportation needs:     Medical: No     Non-medical: No   Tobacco Use    Smoking status: Former Smoker     Packs/day: 2.00     Years: 25.00     Pack years: 50.00     Types: Cigarettes     Start date: 10/8/1969     Last attempt to quit: 10/8/1994     Years since quittin.5    Smokeless tobacco: Never Used   Substance and Sexual Activity    Alcohol use: Yes     Alcohol/week: 2.0 standard drinks     Types: 2 Cans of beer per week     Frequency: Never     Binge frequency: Never     Comment: occasional    Drug use: No    Sexual activity: Not on file   Lifestyle    Physical activity:     Days per week: 0 days     Minutes per session: Not on file    Stress: Very much   Relationships    Social connections:     Talks on phone: Once a week     Gets together: Twice a week     Attends [employer]  service: Not on file     Active member of club or organization: Yes     Attends meetings of clubs or organizations: Never     Relationship status:    Other Topics Concern    Not on file   Social History Narrative    Live with wife       Current Outpatient Medications   Medication Sig Dispense Refill    ALPRAZolam (XANAX) 0.5 MG tablet Take 2 tablets (1 mg total) by mouth 3 (three) times daily as needed for Anxiety. 270 tablet 1    aspirin (ECOTRIN) 81 MG EC tablet Take 1 tablet by mouth once daily.      atenolol (TENORMIN) 50 MG tablet Take 1 tablet (50 mg total) by mouth once daily. 90 tablet 1    carbidopa-levodopa  mg (SINEMET)  mg per tablet Take 1 tablet by mouth 4 (four) times daily.       donepezil (ARICEPT) 10 MG tablet Take 1 tablet by mouth once daily.      irbesartan (AVAPRO) 300 MG tablet Take 1 tablet (300 mg total) by mouth every evening. 90 tablet 1    melatonin 10 mg Tab Take 1 tablet by mouth every evening.      multivitamin (THERAGRAN) per tablet Take 1 tablet by mouth once daily.      pravastatin (PRAVACHOL) 40 MG tablet TAKE 1 TABLET DAILY 90 tablet 3     No current facility-administered medications for this visit.        Review of patient's allergies indicates:  No Known Allergies  Objective:      There were no vitals taken for this visit. There is no height or weight on file to calculate BMI.   Physical Exam   Constitutional: He is oriented to person, place, and time. He appears well-developed.   HENT:   Head: Normocephalic and atraumatic.   Eyes: Pupils are equal, round, and reactive to light.   Neck: Normal range of motion.   Neurological: He is alert and oriented to person, place, and time.   Psychiatric: He has a normal mood and affect. His behavior is normal. Thought content normal.           Assessment:       1. Chronic anxiety    2. Parkinson's disease    3. Memory loss, long term        Plan:       Diagnoses and all orders for this visit:    Chronic  anxiety  Xanax Rx   Parkinson's disease  Progressing, increase ensure  Memory loss, long term  Progressing, requires 24/7 care  [place] [facility]  Labs discussed and stable

## 2020-04-29 ENCOUNTER — TELEPHONE (OUTPATIENT)
Dept: PULMONOLOGY | Facility: CLINIC | Age: 75
End: 2020-04-29

## 2020-10-15 ENCOUNTER — OFFICE VISIT (OUTPATIENT)
Dept: FAMILY MEDICINE | Facility: CLINIC | Age: 75
End: 2020-10-15
Payer: MEDICARE

## 2020-10-15 VITALS
HEIGHT: 66 IN | TEMPERATURE: 99 F | HEART RATE: 60 BPM | DIASTOLIC BLOOD PRESSURE: 80 MMHG | WEIGHT: 127 LBS | BODY MASS INDEX: 20.41 KG/M2 | SYSTOLIC BLOOD PRESSURE: 130 MMHG

## 2020-10-15 DIAGNOSIS — F41.9 CHRONIC ANXIETY: ICD-10-CM

## 2020-10-15 DIAGNOSIS — F03.90 DEMENTIA WITHOUT BEHAVIORAL DISTURBANCE, UNSPECIFIED DEMENTIA TYPE: ICD-10-CM

## 2020-10-15 DIAGNOSIS — I10 ESSENTIAL HYPERTENSION: ICD-10-CM

## 2020-10-15 DIAGNOSIS — Z12.5 PROSTATE CANCER SCREENING: ICD-10-CM

## 2020-10-15 DIAGNOSIS — I34.1 MVP (MITRAL VALVE PROLAPSE): ICD-10-CM

## 2020-10-15 DIAGNOSIS — Z23 NEED FOR INFLUENZA VACCINATION: Primary | ICD-10-CM

## 2020-10-15 DIAGNOSIS — G20.A1 PARKINSON'S DISEASE: ICD-10-CM

## 2020-10-15 DIAGNOSIS — R63.0 DECREASED APPETITE: ICD-10-CM

## 2020-10-15 DIAGNOSIS — E78.01 FAMILIAL HYPERCHOLESTEROLEMIA: ICD-10-CM

## 2020-10-15 PROCEDURE — 90662 FLU VACCINE - QUADRIVALENT - HIGH DOSE (65+) PRESERVATIVE FREE IM: ICD-10-PCS | Mod: S$GLB,,, | Performed by: NURSE PRACTITIONER

## 2020-10-15 PROCEDURE — 99214 PR OFFICE/OUTPT VISIT, EST, LEVL IV, 30-39 MIN: ICD-10-PCS | Mod: 25,S$GLB,, | Performed by: NURSE PRACTITIONER

## 2020-10-15 PROCEDURE — 90662 IIV NO PRSV INCREASED AG IM: CPT | Mod: S$GLB,,, | Performed by: NURSE PRACTITIONER

## 2020-10-15 PROCEDURE — G0008 ADMIN INFLUENZA VIRUS VAC: HCPCS | Mod: S$GLB,,, | Performed by: NURSE PRACTITIONER

## 2020-10-15 PROCEDURE — 99214 OFFICE O/P EST MOD 30 MIN: CPT | Mod: 25,S$GLB,, | Performed by: NURSE PRACTITIONER

## 2020-10-15 PROCEDURE — G0008 FLU VACCINE - QUADRIVALENT - HIGH DOSE (65+) PRESERVATIVE FREE IM: ICD-10-PCS | Mod: S$GLB,,, | Performed by: NURSE PRACTITIONER

## 2020-10-15 RX ORDER — IRBESARTAN 300 MG/1
300 TABLET ORAL NIGHTLY
Qty: 90 TABLET | Refills: 1 | Status: SHIPPED | OUTPATIENT
Start: 2020-10-15 | End: 2021-01-07 | Stop reason: SDUPTHER

## 2020-10-15 RX ORDER — ISTRADEFYLLINE 20 MG/1
1 TABLET, FILM COATED ORAL DAILY
Status: ON HOLD | COMMUNITY
Start: 2020-10-14 | End: 2022-01-12 | Stop reason: HOSPADM

## 2020-10-15 RX ORDER — ALPRAZOLAM 0.5 MG/1
1 TABLET ORAL 3 TIMES DAILY PRN
Qty: 270 TABLET | Refills: 1 | Status: SHIPPED | OUTPATIENT
Start: 2020-10-15 | End: 2021-04-07

## 2020-10-15 RX ORDER — PRAVASTATIN SODIUM 40 MG/1
40 TABLET ORAL DAILY
Qty: 90 TABLET | Refills: 1 | Status: SHIPPED | OUTPATIENT
Start: 2020-10-15 | End: 2021-11-18 | Stop reason: SDUPTHER

## 2020-10-15 RX ORDER — ATENOLOL 50 MG/1
50 TABLET ORAL DAILY
Qty: 90 TABLET | Refills: 1 | Status: SHIPPED | OUTPATIENT
Start: 2020-10-15 | End: 2021-05-13 | Stop reason: SDUPTHER

## 2020-10-15 NOTE — PROGRESS NOTES
SUBJECTIVE:    Patient ID: Jonathon Pantoja is a 75 y.o. male.    Chief Complaint: Establish Care (- no bottles - tk)    Pt presents to establish care. Significant history including Parkinson's, dementia, hypertension, and hyperlipidemia. Sees Dr. Mendoza with Neurology. Recently started on Nourianz in addition to Sinemet. Wife reports had lots of appetite and weight loss issues with Sinemet but much improved since decreasing frequency of dosing. However, she is concerned his appetite is decreased again since starting Nourianz. Pt is a former smoker. Does suffer with some night terrors which wife states is related to dementia per Dr. Mendoza. Pt remains independent with ADLs but requires assistance with meal prep, paying bills, and housework. Does not use any assistive devices to get around. C/o some left sided-low back that has been ongoing about one week. Wife states he gets this pain every so often. Otherwise no other pains. No recent falls.         No visits with results within 6 Month(s) from this visit.   Latest known visit with results is:   Patient Message on 04/07/2020   Component Date Value Ref Range Status    Glucose 04/14/2020 113* 65 - 99 mg/dL Final    BUN, Bld 04/14/2020 12  8 - 27 mg/dL Final    Creatinine 04/14/2020 0.74* 0.76 - 1.27 mg/dL Final    eGFR if non African American 04/14/2020 90  >59 mL/min/1.73 Final    eGFR if African American 04/14/2020 104  >59 mL/min/1.73 Final    BUN/Creatinine Ratio 04/14/2020 16  10 - 24 Final    Sodium 04/14/2020 144  134 - 144 mmol/L Final    Potassium 04/14/2020 4.5  3.5 - 5.2 mmol/L Final    Chloride 04/14/2020 105  96 - 106 mmol/L Final    CO2 04/14/2020 22  20 - 29 mmol/L Final    Calcium 04/14/2020 9.1  8.6 - 10.2 mg/dL Final    Total Protein 04/14/2020 5.9* 6.0 - 8.5 g/dL Final    Albumin 04/14/2020 3.9  3.7 - 4.7 g/dL Final    Globulin, Total 04/14/2020 2.0  1.5 - 4.5 g/dL Final    Albumin/Globulin Ratio 04/14/2020 2.0  1.2 - 2.2 Final     Total Bilirubin 04/14/2020 0.7  0.0 - 1.2 mg/dL Final    Alkaline Phosphatase 04/14/2020 71  39 - 117 IU/L Final    AST 04/14/2020 19  0 - 40 IU/L Final    ALT 04/14/2020 15  0 - 44 IU/L Final    Cholesterol 04/14/2020 125  100 - 199 mg/dL Final    Triglycerides 04/14/2020 45  0 - 149 mg/dL Final    HDL 04/14/2020 57  >39 mg/dL Final    VLDL Cholesterol Noe 04/14/2020 9  5 - 40 mg/dL Final    LDL Calculated 04/14/2020 59  0 - 99 mg/dL Final       Past Medical History:   Diagnosis Date    Abnormal fasting glucose     Hyperlipidemia     Hypertension     Parkinson's disease      Past Surgical History:   Procedure Laterality Date    INGUINAL HERNIA REPAIR Bilateral      Family History   Problem Relation Age of Onset    Alzheimer's disease Mother 94    Heart attack Father 50    Dementia Brother 74    Heart attack Brother 50    Diabetes Brother     Heart disease Brother     Hypertension Brother        Marital Status:   Alcohol History:  reports previous alcohol use.  Tobacco History:  reports that he quit smoking about 14 years ago. His smoking use included cigarettes and cigars. He started smoking about 51 years ago. He has a 50.00 pack-year smoking history. He has never used smokeless tobacco.  Drug History:  reports no history of drug use.    Review of patient's allergies indicates:  No Known Allergies    Current Outpatient Medications:     istradefylline (NOURIANZ) 20 mg Tab, Take 1 tablet by mouth once daily., Disp: , Rfl:     ALPRAZolam (XANAX) 0.5 MG tablet, Take 2 tablets (1 mg total) by mouth 3 (three) times daily as needed for Anxiety., Disp: 270 tablet, Rfl: 1    aspirin (ECOTRIN) 81 MG EC tablet, Take 1 tablet by mouth once daily., Disp: , Rfl:     atenoloL (TENORMIN) 50 MG tablet, Take 1 tablet (50 mg total) by mouth once daily., Disp: 90 tablet, Rfl: 1    carbidopa-levodopa  mg (SINEMET)  mg per tablet, Take 1 tablet by mouth 4 (four) times daily. , Disp: , Rfl:  "    donepezil (ARICEPT) 10 MG tablet, Take 1 tablet by mouth once daily., Disp: , Rfl:     irbesartan (AVAPRO) 300 MG tablet, Take 1 tablet (300 mg total) by mouth every evening., Disp: 90 tablet, Rfl: 1    melatonin 10 mg Tab, Take 1 tablet by mouth every evening., Disp: , Rfl:     multivitamin (THERAGRAN) per tablet, Take 1 tablet by mouth once daily., Disp: , Rfl:     pravastatin (PRAVACHOL) 40 MG tablet, Take 1 tablet (40 mg total) by mouth once daily., Disp: 90 tablet, Rfl: 1    Review of Systems   Constitutional: Negative.    HENT: Negative for congestion, ear pain, sinus pressure, sinus pain, tinnitus and trouble swallowing.    Eyes: Negative for pain and redness.   Respiratory: Negative for cough, chest tightness, shortness of breath and wheezing.    Cardiovascular: Negative for chest pain and palpitations.   Gastrointestinal: Negative for abdominal pain, nausea and vomiting.   Genitourinary: Negative for dysuria, frequency and urgency.        Nocturia 2x night   Musculoskeletal: Positive for back pain. Negative for arthralgias and myalgias.   Skin: Negative for rash and wound.   Neurological: Positive for tremors. Negative for dizziness, weakness, light-headedness and headaches.   Psychiatric/Behavioral: Positive for confusion and sleep disturbance. The patient is nervous/anxious.           Objective:      Vitals:    10/15/20 1405   BP: 130/80   Pulse: 60   Temp: 99 °F (37.2 °C)   Weight: 57.6 kg (127 lb)   Height: 5' 6" (1.676 m)     Body mass index is 20.5 kg/m².  Physical Exam  Vitals signs reviewed.   Constitutional:       General: He is not in acute distress.     Appearance: Normal appearance. He is well-developed.   HENT:      Head: Normocephalic and atraumatic.      Right Ear: Tympanic membrane and external ear normal.      Left Ear: Tympanic membrane and external ear normal.      Nose: Nose normal. No nasal deformity or mucosal edema.      Mouth/Throat:      Dentition: No dental caries or " dental abscesses.      Pharynx: No oropharyngeal exudate.   Eyes:      General: Lids are normal.      Conjunctiva/sclera: Conjunctivae normal.      Pupils: Pupils are equal, round, and reactive to light.   Neck:      Musculoskeletal: Normal range of motion.      Thyroid: No thyromegaly.      Vascular: No JVD.      Trachea: No tracheal tenderness or tracheal deviation.   Cardiovascular:      Rate and Rhythm: Normal rate and regular rhythm.      Heart sounds: Normal heart sounds. No murmur.   Pulmonary:      Effort: Pulmonary effort is normal. No accessory muscle usage or respiratory distress.      Breath sounds: Normal breath sounds.   Abdominal:      General: Bowel sounds are normal.      Palpations: Abdomen is soft.      Tenderness: There is no abdominal tenderness.   Musculoskeletal:         General: No tenderness.      Comments: Moves with shuffled gait. Good ROM.   Lymphadenopathy:      Cervical: No cervical adenopathy.   Skin:     General: Skin is warm and dry.      Capillary Refill: Capillary refill takes less than 2 seconds.      Findings: No bruising, ecchymosis or erythema.   Neurological:      Mental Status: He is alert and oriented to person, place, and time.   Psychiatric:         Mood and Affect: Mood normal.         Speech: Speech is delayed.         Behavior: Behavior is slowed.         Cognition and Memory: Cognition is impaired.           Assessment:       1. Need for influenza vaccination    2. Essential hypertension    3. Familial hypercholesterolemia    4. MVP (mitral valve prolapse)    5. Chronic anxiety    6. Prostate cancer screening    7. Parkinson's disease    8. Dementia without behavioral disturbance, unspecified dementia type    9. Decreased appetite    10. BMI 20.0-20.9, adult         Plan:       Need for influenza vaccination  -     Influenza - Quadrivalent - High Dose (65+) (PF) (IM)    Essential hypertension  Comments:  Will switch to a different ARB and drop the amlodipine for now.   BP in the 120s/70s may improve fatigue and occasional lightheadedness.  Orders:  -     irbesartan (AVAPRO) 300 MG tablet; Take 1 tablet (300 mg total) by mouth every evening.  Dispense: 90 tablet; Refill: 1  -     atenoloL (TENORMIN) 50 MG tablet; Take 1 tablet (50 mg total) by mouth once daily.  Dispense: 90 tablet; Refill: 1  -     CBC auto differential; Future; Expected date: 04/15/2021  -     Comprehensive Metabolic Panel; Future; Expected date: 04/15/2021    Familial hypercholesterolemia  -     pravastatin (PRAVACHOL) 40 MG tablet; Take 1 tablet (40 mg total) by mouth once daily.  Dispense: 90 tablet; Refill: 1  -     Lipid Panel; Future; Expected date: 04/15/2021  -     TSH; Future; Expected date: 04/15/2021    MVP (mitral valve prolapse)  -     atenoloL (TENORMIN) 50 MG tablet; Take 1 tablet (50 mg total) by mouth once daily.  Dispense: 90 tablet; Refill: 1    Chronic anxiety  -     ALPRAZolam (XANAX) 0.5 MG tablet; Take 2 tablets (1 mg total) by mouth 3 (three) times daily as needed for Anxiety.  Dispense: 270 tablet; Refill: 1    Prostate cancer screening  -     PSA, Screening; Future; Expected date: 04/15/2021    Parkinson's disease    Dementia without behavioral disturbance, unspecified dementia type    Decreased appetite    BMI 20.0-20.9, adult  - Discussed protein drink supplements. Monitor appetite and discuss concerns with Dr. Mendoza regarding medications.     Follow up in about 6 months (around 4/15/2021) for Follow-up with Dr. Gray, Annual Physical.

## 2021-01-07 DIAGNOSIS — E78.01 FAMILIAL HYPERCHOLESTEROLEMIA: ICD-10-CM

## 2021-01-07 DIAGNOSIS — I10 ESSENTIAL HYPERTENSION: ICD-10-CM

## 2021-01-07 RX ORDER — IRBESARTAN 300 MG/1
300 TABLET ORAL NIGHTLY
Qty: 90 TABLET | Refills: 1 | Status: SHIPPED | OUTPATIENT
Start: 2021-01-07 | End: 2021-06-28 | Stop reason: SDUPTHER

## 2021-01-07 RX ORDER — PRAVASTATIN SODIUM 40 MG/1
40 TABLET ORAL DAILY
Qty: 90 TABLET | Refills: 1 | Status: CANCELLED | OUTPATIENT
Start: 2021-01-07

## 2021-02-08 ENCOUNTER — OFFICE VISIT (OUTPATIENT)
Dept: FAMILY MEDICINE | Facility: CLINIC | Age: 76
End: 2021-02-08
Payer: MEDICARE

## 2021-02-08 VITALS
HEIGHT: 66 IN | HEART RATE: 74 BPM | BODY MASS INDEX: 19.61 KG/M2 | DIASTOLIC BLOOD PRESSURE: 72 MMHG | SYSTOLIC BLOOD PRESSURE: 114 MMHG | WEIGHT: 122 LBS | TEMPERATURE: 98 F | OXYGEN SATURATION: 96 %

## 2021-02-08 DIAGNOSIS — Z11.1 SCREENING-PULMONARY TB: Primary | ICD-10-CM

## 2021-02-08 DIAGNOSIS — G20.A1 PARKINSON'S DISEASE: ICD-10-CM

## 2021-02-08 DIAGNOSIS — R41.3 MEMORY LOSS, LONG TERM: ICD-10-CM

## 2021-02-08 DIAGNOSIS — Z02.2 ENCOUNTER FOR EXAMINATION FOR ADMISSION TO NURSING HOME: ICD-10-CM

## 2021-02-08 DIAGNOSIS — I10 ESSENTIAL HYPERTENSION: ICD-10-CM

## 2021-02-08 PROCEDURE — 99213 PR OFFICE/OUTPT VISIT, EST, LEVL III, 20-29 MIN: ICD-10-PCS | Mod: S$GLB,,, | Performed by: NURSE PRACTITIONER

## 2021-02-08 PROCEDURE — 86580 TB INTRADERMAL TEST: CPT | Mod: S$GLB,,, | Performed by: NURSE PRACTITIONER

## 2021-02-08 PROCEDURE — 86580 POCT TB SKIN TEST: ICD-10-PCS | Mod: S$GLB,,, | Performed by: NURSE PRACTITIONER

## 2021-02-08 PROCEDURE — 99213 OFFICE O/P EST LOW 20 MIN: CPT | Mod: S$GLB,,, | Performed by: NURSE PRACTITIONER

## 2021-02-08 RX ORDER — CLONAZEPAM 0.5 MG/1
TABLET ORAL 2 TIMES DAILY
COMMUNITY
Start: 2020-11-10 | End: 2021-02-22 | Stop reason: SDUPTHER

## 2021-02-10 ENCOUNTER — CLINICAL SUPPORT (OUTPATIENT)
Dept: FAMILY MEDICINE | Facility: CLINIC | Age: 76
End: 2021-02-10
Payer: MEDICARE

## 2021-02-10 LAB
TB INDURATION - 48 HR READ: 0 MM
TB INDURATION - 72 HR READ: 0 MM
TB SKIN TEST - 48 HR READ: NEGATIVE
TB SKIN TEST - 72 HR READ: NEGATIVE

## 2021-02-22 RX ORDER — CLONAZEPAM 0.5 MG/1
0.5 TABLET ORAL 2 TIMES DAILY PRN
Qty: 60 TABLET | Refills: 1 | Status: SHIPPED | OUTPATIENT
Start: 2021-02-22 | End: 2021-04-07 | Stop reason: SDUPTHER

## 2021-03-14 ENCOUNTER — OUTSIDE PLACE OF SERVICE (OUTPATIENT)
Dept: FAMILY MEDICINE | Facility: CLINIC | Age: 76
End: 2021-03-14
Payer: MEDICARE

## 2021-03-14 DIAGNOSIS — I10 HYPERTENSION: ICD-10-CM

## 2021-03-14 DIAGNOSIS — G20.A1 PARKINSON'S DISEASE: ICD-10-CM

## 2021-03-14 DIAGNOSIS — E78.5 HYPERLIPIDEMIA: ICD-10-CM

## 2021-03-14 DIAGNOSIS — F03.90 DEMENTIA: ICD-10-CM

## 2021-03-14 PROCEDURE — 99349 HOME/RES VST EST MOD MDM 40: CPT | Mod: S$GLB,,, | Performed by: FAMILY MEDICINE

## 2021-03-14 PROCEDURE — 99349 PR HOME VISIT,ESTAB PATIENT,LEVEL III: ICD-10-PCS | Mod: S$GLB,,, | Performed by: FAMILY MEDICINE

## 2021-03-25 ENCOUNTER — TELEPHONE (OUTPATIENT)
Dept: FAMILY MEDICINE | Facility: CLINIC | Age: 76
End: 2021-03-25

## 2021-03-29 DIAGNOSIS — I10 ESSENTIAL HYPERTENSION: Primary | ICD-10-CM

## 2021-03-29 RX ORDER — AMLODIPINE BESYLATE 5 MG/1
5 TABLET ORAL DAILY
Qty: 30 TABLET | Refills: 4 | Status: SHIPPED | OUTPATIENT
Start: 2021-03-29 | End: 2021-04-07 | Stop reason: SDUPTHER

## 2021-04-05 ENCOUNTER — TELEPHONE (OUTPATIENT)
Dept: FAMILY MEDICINE | Facility: CLINIC | Age: 76
End: 2021-04-05

## 2021-04-07 DIAGNOSIS — I10 ESSENTIAL HYPERTENSION: ICD-10-CM

## 2021-04-07 RX ORDER — AMLODIPINE BESYLATE 5 MG/1
5 TABLET ORAL 2 TIMES DAILY
Qty: 180 TABLET | Refills: 1 | Status: SHIPPED | OUTPATIENT
Start: 2021-04-07 | End: 2021-11-16 | Stop reason: SDUPTHER

## 2021-04-07 RX ORDER — CLONAZEPAM 0.5 MG/1
0.5 TABLET ORAL 2 TIMES DAILY PRN
Qty: 180 TABLET | Refills: 1 | Status: SHIPPED | OUTPATIENT
Start: 2021-04-07

## 2021-04-09 ENCOUNTER — TELEPHONE (OUTPATIENT)
Dept: FAMILY MEDICINE | Facility: CLINIC | Age: 76
End: 2021-04-09

## 2021-04-19 DIAGNOSIS — I10 ESSENTIAL HYPERTENSION: ICD-10-CM

## 2021-05-04 ENCOUNTER — OUTSIDE PLACE OF SERVICE (OUTPATIENT)
Dept: FAMILY MEDICINE | Facility: CLINIC | Age: 76
End: 2021-05-04
Payer: MEDICARE

## 2021-05-04 DIAGNOSIS — R26.9 ABNORMAL GAIT: ICD-10-CM

## 2021-05-04 DIAGNOSIS — R60.0 EDEMA OF BOTH LEGS: ICD-10-CM

## 2021-05-04 DIAGNOSIS — I10 HYPERTENSION: ICD-10-CM

## 2021-05-04 DIAGNOSIS — G20.A1 PARKINSON'S DISEASE: ICD-10-CM

## 2021-05-04 PROCEDURE — 99348 HOME/RES VST EST LOW MDM 30: CPT | Mod: S$GLB,,, | Performed by: FAMILY MEDICINE

## 2021-05-04 PROCEDURE — 99348 PR HOME VISIT,ESTAB PATIENT,LEVEL II: ICD-10-PCS | Mod: S$GLB,,, | Performed by: FAMILY MEDICINE

## 2021-05-06 ENCOUNTER — TELEPHONE (OUTPATIENT)
Dept: FAMILY MEDICINE | Facility: CLINIC | Age: 76
End: 2021-05-06

## 2021-05-13 DIAGNOSIS — I10 ESSENTIAL HYPERTENSION: ICD-10-CM

## 2021-05-13 DIAGNOSIS — I34.1 MVP (MITRAL VALVE PROLAPSE): ICD-10-CM

## 2021-05-13 RX ORDER — ATENOLOL 50 MG/1
50 TABLET ORAL DAILY
Qty: 90 TABLET | Refills: 1 | Status: SHIPPED | OUTPATIENT
Start: 2021-05-13 | End: 2021-11-18 | Stop reason: SDUPTHER

## 2021-06-21 ENCOUNTER — PATIENT MESSAGE (OUTPATIENT)
Dept: FAMILY MEDICINE | Facility: CLINIC | Age: 76
End: 2021-06-21

## 2021-06-28 DIAGNOSIS — I10 ESSENTIAL HYPERTENSION: ICD-10-CM

## 2021-06-28 RX ORDER — IRBESARTAN 300 MG/1
300 TABLET ORAL NIGHTLY
Qty: 90 TABLET | Refills: 1 | Status: SHIPPED | OUTPATIENT
Start: 2021-06-28 | End: 2021-11-18 | Stop reason: SDUPTHER

## 2021-08-03 ENCOUNTER — OUTSIDE PLACE OF SERVICE (OUTPATIENT)
Dept: FAMILY MEDICINE | Facility: CLINIC | Age: 76
End: 2021-08-03
Payer: MEDICARE

## 2021-08-03 DIAGNOSIS — E78.5 HYPERLIPIDEMIA: ICD-10-CM

## 2021-08-03 DIAGNOSIS — F03.90 DEMENTIA: ICD-10-CM

## 2021-08-03 DIAGNOSIS — G20.A1 PARKINSON'S DISEASE: ICD-10-CM

## 2021-08-03 DIAGNOSIS — I10 HYPERTENSION: ICD-10-CM

## 2021-08-03 DIAGNOSIS — Z99.89 WALKER AS AMBULATION AID: ICD-10-CM

## 2021-08-03 PROCEDURE — 99349 PR HOME VISIT,ESTAB PATIENT,LEVEL III: ICD-10-PCS | Mod: GW,S$GLB,, | Performed by: FAMILY MEDICINE

## 2021-08-03 PROCEDURE — 99349 HOME/RES VST EST MOD MDM 40: CPT | Mod: GW,S$GLB,, | Performed by: FAMILY MEDICINE

## 2021-10-15 ENCOUNTER — TELEPHONE (OUTPATIENT)
Dept: FAMILY MEDICINE | Facility: CLINIC | Age: 76
End: 2021-10-15
Payer: MEDICARE

## 2021-11-02 ENCOUNTER — TELEPHONE (OUTPATIENT)
Dept: FAMILY MEDICINE | Facility: CLINIC | Age: 76
End: 2021-11-02
Payer: MEDICARE

## 2021-11-05 ENCOUNTER — OUTSIDE PLACE OF SERVICE (OUTPATIENT)
Dept: FAMILY MEDICINE | Facility: CLINIC | Age: 76
End: 2021-11-05
Payer: MEDICARE

## 2021-11-05 DIAGNOSIS — G20.A1 PARKINSON'S DISEASE: ICD-10-CM

## 2021-11-05 DIAGNOSIS — B34.9 VIRAL ILLNESS: ICD-10-CM

## 2021-11-05 DIAGNOSIS — I10 HYPERTENSION: ICD-10-CM

## 2021-11-05 DIAGNOSIS — Z99.89 DEPENDENCE ON OTHER ENABLING MACHINE: ICD-10-CM

## 2021-11-05 PROCEDURE — 99348 HOME/RES VST EST LOW MDM 30: CPT | Mod: GW,S$GLB,, | Performed by: FAMILY MEDICINE

## 2021-11-05 PROCEDURE — 99348 PR HOME VISIT,ESTAB PATIENT,LEVEL II: ICD-10-PCS | Mod: GW,S$GLB,, | Performed by: FAMILY MEDICINE

## 2021-11-16 DIAGNOSIS — I10 ESSENTIAL HYPERTENSION: ICD-10-CM

## 2021-11-16 RX ORDER — AMLODIPINE BESYLATE 5 MG/1
5 TABLET ORAL 2 TIMES DAILY
Qty: 180 TABLET | Refills: 1 | Status: SHIPPED | OUTPATIENT
Start: 2021-11-16 | End: 2022-11-16

## 2021-11-18 DIAGNOSIS — I34.1 MVP (MITRAL VALVE PROLAPSE): ICD-10-CM

## 2021-11-18 DIAGNOSIS — E78.01 FAMILIAL HYPERCHOLESTEROLEMIA: ICD-10-CM

## 2021-11-18 DIAGNOSIS — I10 ESSENTIAL HYPERTENSION: ICD-10-CM

## 2021-11-18 RX ORDER — ATENOLOL 50 MG/1
50 TABLET ORAL DAILY
Qty: 90 TABLET | Refills: 1 | Status: SHIPPED | OUTPATIENT
Start: 2021-11-18

## 2021-11-18 RX ORDER — PRAVASTATIN SODIUM 40 MG/1
40 TABLET ORAL DAILY
Qty: 90 TABLET | Refills: 1 | Status: SHIPPED | OUTPATIENT
Start: 2021-11-18

## 2021-11-18 RX ORDER — IRBESARTAN 300 MG/1
300 TABLET ORAL NIGHTLY
Qty: 90 TABLET | Refills: 1 | Status: SHIPPED | OUTPATIENT
Start: 2021-11-18

## 2022-01-08 ENCOUNTER — HOSPITAL ENCOUNTER (INPATIENT)
Facility: HOSPITAL | Age: 77
LOS: 3 days | Discharge: HOSPICE | DRG: 536 | End: 2022-01-12
Attending: EMERGENCY MEDICINE | Admitting: INTERNAL MEDICINE
Payer: MEDICARE

## 2022-01-08 DIAGNOSIS — S72.001A CLOSED FRACTURE OF RIGHT HIP, INITIAL ENCOUNTER: Primary | ICD-10-CM

## 2022-01-08 DIAGNOSIS — R07.9 CHEST PAIN: ICD-10-CM
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
## 2022-01-08 PROCEDURE — 93010 ELECTROCARDIOGRAM REPORT: CPT | Mod: GW,,, | Performed by: INTERNAL MEDICINE

## 2022-01-08 PROCEDURE — 84484 ASSAY OF TROPONIN QUANT: CPT | Performed by: EMERGENCY MEDICINE

## 2022-01-08 PROCEDURE — 93005 ELECTROCARDIOGRAM TRACING: CPT | Performed by: INTERNAL MEDICINE

## 2022-01-08 PROCEDURE — 93010 EKG 12-LEAD: ICD-10-PCS | Mod: GW,,, | Performed by: INTERNAL MEDICINE

## 2022-01-08 PROCEDURE — 84443 ASSAY THYROID STIM HORMONE: CPT | Performed by: EMERGENCY MEDICINE

## 2022-01-08 PROCEDURE — 82550 ASSAY OF CK (CPK): CPT | Performed by: EMERGENCY MEDICINE

## 2022-01-08 PROCEDURE — 36415 COLL VENOUS BLD VENIPUNCTURE: CPT | Performed by: EMERGENCY MEDICINE

## 2022-01-08 PROCEDURE — 83605 ASSAY OF LACTIC ACID: CPT | Performed by: EMERGENCY MEDICINE

## 2022-01-08 PROCEDURE — 85025 COMPLETE CBC W/AUTO DIFF WBC: CPT | Performed by: EMERGENCY MEDICINE

## 2022-01-08 PROCEDURE — 83880 ASSAY OF NATRIURETIC PEPTIDE: CPT | Performed by: EMERGENCY MEDICINE

## 2022-01-08 PROCEDURE — U0002 COVID-19 LAB TEST NON-CDC: HCPCS | Performed by: EMERGENCY MEDICINE

## 2022-01-08 PROCEDURE — 80053 COMPREHEN METABOLIC PANEL: CPT | Performed by: EMERGENCY MEDICINE

## 2022-01-08 PROCEDURE — 99285 EMERGENCY DEPT VISIT HI MDM: CPT | Mod: 25

## 2022-01-09 PROBLEM — S72.001A CLOSED FRACTURE OF RIGHT HIP: Status: ACTIVE | Noted: 2022-01-09

## 2022-01-09 LAB
ALBUMIN SERPL BCP-MCNC: 3.7 G/DL (ref 3.5–5.2)
ALP SERPL-CCNC: 74 U/L (ref 55–135)
ALT SERPL W/O P-5'-P-CCNC: 7 U/L (ref 10–44)
ANION GAP SERPL CALC-SCNC: 10 MMOL/L (ref 8–16)
ANION GAP SERPL CALC-SCNC: 10 MMOL/L (ref 8–16)
AST SERPL-CCNC: 19 U/L (ref 10–40)
BASOPHILS # BLD AUTO: 0.04 K/UL (ref 0–0.2)
BASOPHILS # BLD AUTO: 0.07 K/UL (ref 0–0.2)
BASOPHILS NFR BLD: 0.3 % (ref 0–1.9)
BASOPHILS NFR BLD: 0.6 % (ref 0–1.9)
BILIRUB SERPL-MCNC: 1.1 MG/DL (ref 0.1–1)
BNP SERPL-MCNC: 191 PG/ML (ref 0–99)
BUN SERPL-MCNC: 12 MG/DL (ref 8–23)
BUN SERPL-MCNC: 15 MG/DL (ref 8–23)
CALCIUM SERPL-MCNC: 8.8 MG/DL (ref 8.7–10.5)
CALCIUM SERPL-MCNC: 8.9 MG/DL (ref 8.7–10.5)
CHLORIDE SERPL-SCNC: 105 MMOL/L (ref 95–110)
CHLORIDE SERPL-SCNC: 105 MMOL/L (ref 95–110)
CK SERPL-CCNC: 168 U/L (ref 20–200)
CO2 SERPL-SCNC: 26 MMOL/L (ref 23–29)
CO2 SERPL-SCNC: 26 MMOL/L (ref 23–29)
CREAT SERPL-MCNC: 0.4 MG/DL (ref 0.5–1.4)
CREAT SERPL-MCNC: 0.6 MG/DL (ref 0.5–1.4)
DIFFERENTIAL METHOD: ABNORMAL
DIFFERENTIAL METHOD: ABNORMAL
EOSINOPHIL # BLD AUTO: 0 K/UL (ref 0–0.5)
EOSINOPHIL # BLD AUTO: 0 K/UL (ref 0–0.5)
EOSINOPHIL NFR BLD: 0 % (ref 0–8)
EOSINOPHIL NFR BLD: 0.3 % (ref 0–8)
ERYTHROCYTE [DISTWIDTH] IN BLOOD BY AUTOMATED COUNT: 13.2 % (ref 11.5–14.5)
ERYTHROCYTE [DISTWIDTH] IN BLOOD BY AUTOMATED COUNT: 13.3 % (ref 11.5–14.5)
EST. GFR  (AFRICAN AMERICAN): >60 ML/MIN/1.73 M^2
EST. GFR  (AFRICAN AMERICAN): >60 ML/MIN/1.73 M^2
EST. GFR  (NON AFRICAN AMERICAN): >60 ML/MIN/1.73 M^2
EST. GFR  (NON AFRICAN AMERICAN): >60 ML/MIN/1.73 M^2
GLUCOSE SERPL-MCNC: 153 MG/DL (ref 70–110)
GLUCOSE SERPL-MCNC: 159 MG/DL (ref 70–110)
HCT VFR BLD AUTO: 34.6 % (ref 40–54)
HCT VFR BLD AUTO: 35.5 % (ref 40–54)
HGB BLD-MCNC: 11.7 G/DL (ref 14–18)
HGB BLD-MCNC: 11.9 G/DL (ref 14–18)
IMM GRANULOCYTES # BLD AUTO: 0.05 K/UL (ref 0–0.04)
IMM GRANULOCYTES # BLD AUTO: 0.06 K/UL (ref 0–0.04)
IMM GRANULOCYTES NFR BLD AUTO: 0.4 % (ref 0–0.5)
IMM GRANULOCYTES NFR BLD AUTO: 0.4 % (ref 0–0.5)
LACTATE SERPL-SCNC: 1.3 MMOL/L (ref 0.5–1.9)
LYMPHOCYTES # BLD AUTO: 0.7 K/UL (ref 1–4.8)
LYMPHOCYTES # BLD AUTO: 1.2 K/UL (ref 1–4.8)
LYMPHOCYTES NFR BLD: 10.3 % (ref 18–48)
LYMPHOCYTES NFR BLD: 4.6 % (ref 18–48)
MAGNESIUM SERPL-MCNC: 2.1 MG/DL (ref 1.6–2.6)
MCH RBC QN AUTO: 31.9 PG (ref 27–31)
MCH RBC QN AUTO: 31.9 PG (ref 27–31)
MCHC RBC AUTO-ENTMCNC: 33.5 G/DL (ref 32–36)
MCHC RBC AUTO-ENTMCNC: 33.8 G/DL (ref 32–36)
MCV RBC AUTO: 94 FL (ref 82–98)
MCV RBC AUTO: 95 FL (ref 82–98)
MONOCYTES # BLD AUTO: 0.6 K/UL (ref 0.3–1)
MONOCYTES # BLD AUTO: 0.6 K/UL (ref 0.3–1)
MONOCYTES NFR BLD: 3.9 % (ref 4–15)
MONOCYTES NFR BLD: 4.7 % (ref 4–15)
NEUTROPHILS # BLD AUTO: 14.1 K/UL (ref 1.8–7.7)
NEUTROPHILS # BLD AUTO: 9.8 K/UL (ref 1.8–7.7)
NEUTROPHILS NFR BLD: 83.7 % (ref 38–73)
NEUTROPHILS NFR BLD: 90.8 % (ref 38–73)
NRBC BLD-RTO: 0 /100 WBC
NRBC BLD-RTO: 0 /100 WBC
PLATELET # BLD AUTO: 239 K/UL (ref 150–450)
PLATELET # BLD AUTO: 273 K/UL (ref 150–450)
PMV BLD AUTO: 11.2 FL (ref 9.2–12.9)
PMV BLD AUTO: 11.2 FL (ref 9.2–12.9)
POTASSIUM SERPL-SCNC: 3.7 MMOL/L (ref 3.5–5.1)
POTASSIUM SERPL-SCNC: 4 MMOL/L (ref 3.5–5.1)
PROT SERPL-MCNC: 6.7 G/DL (ref 6–8.4)
RBC # BLD AUTO: 3.67 M/UL (ref 4.6–6.2)
RBC # BLD AUTO: 3.73 M/UL (ref 4.6–6.2)
SARS-COV-2 RDRP RESP QL NAA+PROBE: NEGATIVE
SODIUM SERPL-SCNC: 141 MMOL/L (ref 136–145)
SODIUM SERPL-SCNC: 141 MMOL/L (ref 136–145)
TROPONIN I SERPL DL<=0.01 NG/ML-MCNC: <0.03 NG/ML
TSH SERPL DL<=0.005 MIU/L-ACNC: 1.07 UIU/ML (ref 0.34–5.6)
WBC # BLD AUTO: 11.74 K/UL (ref 3.9–12.7)
WBC # BLD AUTO: 15.52 K/UL (ref 3.9–12.7)

## 2022-01-09 PROCEDURE — 83735 ASSAY OF MAGNESIUM: CPT | Performed by: NURSE PRACTITIONER

## 2022-01-09 PROCEDURE — 85025 COMPLETE CBC W/AUTO DIFF WBC: CPT | Performed by: NURSE PRACTITIONER

## 2022-01-09 PROCEDURE — 80048 BASIC METABOLIC PNL TOTAL CA: CPT | Performed by: NURSE PRACTITIONER

## 2022-01-09 PROCEDURE — 99223 1ST HOSP IP/OBS HIGH 75: CPT | Mod: ,,, | Performed by: ORTHOPAEDIC SURGERY

## 2022-01-09 PROCEDURE — 21400001 HC TELEMETRY ROOM

## 2022-01-09 PROCEDURE — 99223 PR INITIAL HOSPITAL CARE,LEVL III: ICD-10-PCS | Mod: ,,, | Performed by: ORTHOPAEDIC SURGERY

## 2022-01-09 PROCEDURE — 36415 COLL VENOUS BLD VENIPUNCTURE: CPT | Performed by: NURSE PRACTITIONER

## 2022-01-09 PROCEDURE — 63600175 PHARM REV CODE 636 W HCPCS: Performed by: INTERNAL MEDICINE

## 2022-01-09 PROCEDURE — 25000003 PHARM REV CODE 250: Performed by: NURSE PRACTITIONER

## 2022-01-09 RX ORDER — NALOXONE HCL 0.4 MG/ML
0.02 VIAL (ML) INJECTION
Status: DISCONTINUED | OUTPATIENT
Start: 2022-01-09 | End: 2022-01-12 | Stop reason: HOSPADM

## 2022-01-09 RX ORDER — LOSARTAN POTASSIUM 50 MG/1
100 TABLET ORAL DAILY
Status: DISCONTINUED | OUTPATIENT
Start: 2022-01-09 | End: 2022-01-12 | Stop reason: HOSPADM

## 2022-01-09 RX ORDER — ACETAMINOPHEN 325 MG/1
650 TABLET ORAL EVERY 4 HOURS PRN
Status: DISCONTINUED | OUTPATIENT
Start: 2022-01-09 | End: 2022-01-12 | Stop reason: HOSPADM

## 2022-01-09 RX ORDER — SODIUM CHLORIDE 0.9 % (FLUSH) 0.9 %
10 SYRINGE (ML) INJECTION
Status: DISCONTINUED | OUTPATIENT
Start: 2022-01-09 | End: 2022-01-12 | Stop reason: HOSPADM

## 2022-01-09 RX ORDER — CARBIDOPA AND LEVODOPA 25; 250 MG/1; MG/1
1 TABLET ORAL 4 TIMES DAILY
Status: DISCONTINUED | OUTPATIENT
Start: 2022-01-09 | End: 2022-01-12 | Stop reason: HOSPADM

## 2022-01-09 RX ORDER — AMOXICILLIN 250 MG
1 CAPSULE ORAL 2 TIMES DAILY PRN
Status: DISCONTINUED | OUTPATIENT
Start: 2022-01-09 | End: 2022-01-12 | Stop reason: HOSPADM

## 2022-01-09 RX ORDER — MORPHINE SULFATE 2 MG/ML
2 INJECTION, SOLUTION INTRAMUSCULAR; INTRAVENOUS EVERY 8 HOURS PRN
Status: DISCONTINUED | OUTPATIENT
Start: 2022-01-09 | End: 2022-01-12 | Stop reason: HOSPADM

## 2022-01-09 RX ORDER — ONDANSETRON 2 MG/ML
4 INJECTION INTRAMUSCULAR; INTRAVENOUS EVERY 6 HOURS PRN
Status: DISCONTINUED | OUTPATIENT
Start: 2022-01-09 | End: 2022-01-12 | Stop reason: HOSPADM

## 2022-01-09 RX ORDER — ASPIRIN 81 MG/1
81 TABLET ORAL DAILY
Status: DISCONTINUED | OUTPATIENT
Start: 2022-01-09 | End: 2022-01-12 | Stop reason: HOSPADM

## 2022-01-09 RX ORDER — CLONAZEPAM 0.5 MG/1
0.5 TABLET ORAL 2 TIMES DAILY PRN
Status: DISCONTINUED | OUTPATIENT
Start: 2022-01-09 | End: 2022-01-12 | Stop reason: HOSPADM

## 2022-01-09 RX ORDER — AMLODIPINE BESYLATE 5 MG/1
5 TABLET ORAL 2 TIMES DAILY
Status: DISCONTINUED | OUTPATIENT
Start: 2022-01-09 | End: 2022-01-12 | Stop reason: HOSPADM

## 2022-01-09 RX ORDER — PRAVASTATIN SODIUM 40 MG/1
40 TABLET ORAL DAILY
Status: DISCONTINUED | OUTPATIENT
Start: 2022-01-09 | End: 2022-01-12 | Stop reason: HOSPADM

## 2022-01-09 RX ORDER — TALC
10 POWDER (GRAM) TOPICAL NIGHTLY
Status: DISCONTINUED | OUTPATIENT
Start: 2022-01-09 | End: 2022-01-12 | Stop reason: HOSPADM

## 2022-01-09 RX ORDER — ATENOLOL 50 MG/1
50 TABLET ORAL DAILY
Status: DISCONTINUED | OUTPATIENT
Start: 2022-01-09 | End: 2022-01-12 | Stop reason: HOSPADM

## 2022-01-09 RX ORDER — MORPHINE SULFATE 2 MG/ML
2 INJECTION, SOLUTION INTRAMUSCULAR; INTRAVENOUS EVERY 4 HOURS PRN
Status: DISCONTINUED | OUTPATIENT
Start: 2022-01-09 | End: 2022-01-09

## 2022-01-09 RX ORDER — DONEPEZIL HYDROCHLORIDE 5 MG/1
10 TABLET, FILM COATED ORAL DAILY
Status: DISCONTINUED | OUTPATIENT
Start: 2022-01-09 | End: 2022-01-12 | Stop reason: HOSPADM

## 2022-01-09 RX ORDER — HYDROCODONE BITARTRATE AND ACETAMINOPHEN 5; 325 MG/1; MG/1
1 TABLET ORAL EVERY 8 HOURS PRN
Status: DISCONTINUED | OUTPATIENT
Start: 2022-01-09 | End: 2022-01-12 | Stop reason: HOSPADM

## 2022-01-09 RX ORDER — HYDROCODONE BITARTRATE AND ACETAMINOPHEN 5; 325 MG/1; MG/1
1 TABLET ORAL EVERY 6 HOURS PRN
Status: DISCONTINUED | OUTPATIENT
Start: 2022-01-09 | End: 2022-01-09

## 2022-01-09 RX ADMIN — MORPHINE SULFATE 2 MG: 2 INJECTION, SOLUTION INTRAMUSCULAR; INTRAVENOUS at 09:01

## 2022-01-09 RX ADMIN — THERA TABS 1 TABLET: TAB at 09:01

## 2022-01-09 RX ADMIN — DONEPEZIL HYDROCHLORIDE 10 MG: 5 TABLET, FILM COATED ORAL at 09:01

## 2022-01-09 RX ADMIN — ATENOLOL 50 MG: 50 TABLET ORAL at 09:01

## 2022-01-09 RX ADMIN — CARBIDOPA AND LEVODOPA 1 TABLET: 25; 250 TABLET ORAL at 09:01

## 2022-01-09 RX ADMIN — PRAVASTATIN SODIUM 40 MG: 40 TABLET ORAL at 09:01

## 2022-01-09 RX ADMIN — ASPIRIN 81 MG: 81 TABLET, DELAYED RELEASE ORAL at 09:01

## 2022-01-09 RX ADMIN — AMLODIPINE BESYLATE 5 MG: 5 TABLET ORAL at 09:01

## 2022-01-09 RX ADMIN — LOSARTAN POTASSIUM 100 MG: 50 TABLET, FILM COATED ORAL at 09:01

## 2022-01-09 RX ADMIN — CARBIDOPA AND LEVODOPA 1 TABLET: 25; 250 TABLET ORAL at 01:01

## 2022-01-09 NOTE — PROGRESS NOTES
[facility] Medicine  Progress Note    Patient Name: Jonathon Pantoja  MRN: [id number]  Patient Class: IP- Inpatient   Admission Date: 1/8/2022  Length of Stay: 0 days  Attending Physician: Peter Mendez MD  Primary Care Provider: Nan Mo NP        Subjective:     Principal Problem:Closed fracture of right hip    Interval History:     Patient is seen and examined   He is very drowsy and not able to answer the questions . RN called to the nursing home and appear his baseline .  Ortho surgeon reviewed and conservative Mx.      Review of Systems  Objective:     Vital Signs (Most Recent):  Temp: 98.2 °F (36.8 °C) (01/09/22 1300)  Pulse: 60 (01/09/22 1300)  Resp: 20 (01/09/22 1300)  BP: (!) 142/64 (01/09/22 1300)  SpO2: 97 % (01/09/22 1300) Vital Signs (24h Range):  Temp:  [98 °F (36.7 °C)-98.6 °F (37 °C)] 98.2 °F (36.8 °C)  Pulse:  [60-96] 60  Resp:  [16-20] 20  SpO2:  [97 %-100 %] 97 %  BP: (124-158)/(60-70) 142/64     Weight: 57.9 kg (127 lb 10.3 oz)  Body mass index is 21.24 kg/m².    Intake/Output Summary (Last 24 hours) at 1/9/2022 1449  Last data filed at 1/9/2022 0911  Gross per 24 hour   Intake 240 ml   Output --   Net 240 ml      Physical Exam  General: appear not coherent   Eyes: EOM intact. No conjunctivae injection. No scleral icterus.  ENT: Hearing grossly intact. No discharge from ears. No nasal discharge.   CVS: RRR. No LE edema BL.  Lungs:  Good breath sounds. No accessory muscle use. No acute respiratory distress  Neuro: non focal , does  Not Follows commands.    Significant Labs:   All pertinent labs within the past 24 hours have been reviewed.  BMP:   Recent Labs   Lab 01/09/22  0843   *      K 3.7      CO2 26   BUN 12   CREATININE 0.4*   CALCIUM 8.8   MG 2.1     CBC:   Recent Labs   Lab 01/08/22  2332 01/09/22  0843   WBC 15.52* 11.74   HGB 11.7* 11.9*   HCT 34.6* 35.5*    273     CMP:   Recent Labs   Lab 01/08/22  2332 01/09/22  0868     141   K 4.0 3.7    105   CO2 26 26   * 153*   BUN 15 12   CREATININE 0.6 0.4*   CALCIUM 8.9 8.8   PROT 6.7  --    ALBUMIN 3.7  --    BILITOT 1.1*  --    ALKPHOS 74  --    AST 19  --    ALT 7*  --    ANIONGAP 10 10   EGFRNONAA >60.0 >60.0     Cardiac Markers:   Recent Labs   Lab 01/08/22  2332   *       Significant Imaging: I have reviewed all pertinent imaging results/findings within the past 24 hours.    Assessment/Plan:      Active Diagnoses:    Diagnosis Date Noted POA    PRINCIPAL PROBLEM:  Closed fracture of right hip [S72.001A] 01/09/2022 Unknown    Parkinson's disease [G20] 05/01/2018 Yes    Hypertension [I10] 11/20/2017 Yes      Problems Resolved During this Admission:     ASSESSMENT AND PLAN       # Acute transverse fracture of the greater trochanter of the right femur secondary to fall      Chronic problems  Hypertension  Hyperlipidemia  Parkinson's           Plan  Conservative Mx as per ortho   may now need inpatient rehab/SNF  Continue home meds as ordered for chronic conditions    avoid drowsy medications   Patient may be candidate for hospice given advance parkinsonism and appear advanced dementia     VTE Risk Mitigation (From admission, onward)         Ordered     IP VTE HIGH RISK PATIENT  Once         01/09/22 0344     Place sequential compression device  Until discontinued         01/09/22 0344     Place MENDEZ hose  Until discontinued         01/09/22 0344                   Peter Mendez MD  Department of Hospital Medicine   [facility]

## 2022-01-09 NOTE — ED PROVIDER NOTES
Encounter Date: 1/8/2022       History     Chief Complaint   Patient presents with    [facility]. Normally only oriented to himself.      76-year-old male presenting with fall, patient is housed in a memory care center patient has a history of hypertension hyperlipidemia and Parkinson's disease patient was found on the ground this was an unwitnessed fall unknown if loss of consciousness.  Patient is at his mental baseline per nursing home staff patient reports he has pain to his right knee and there is a bruise noted on the lateral aspect otherwise patient has no complaints.        Review of patient's allergies indicates:  No Known Allergies  Past Medical History:   Diagnosis Date    Abnormal fasting glucose     Hyperlipidemia     Hypertension     Parkinson's disease      Past Surgical History:   Procedure Laterality Date    INGUINAL HERNIA REPAIR Bilateral      Family History   Problem Relation Age of Onset    Alzheimer's disease Mother 94    Heart attack Father 50    Dementia Brother 74    Heart attack Brother 50    Diabetes Brother     Heart disease Brother     Hypertension Brother      Social History     Tobacco Use    Smoking status: Former Smoker     Packs/day: 2.00     Years: 25.00     Pack years: 50.00     Types: Cigarettes, Cigars     Start date: 10/8/1969     Quit date: 10/8/2006     Years since quitting: 15.2    Smokeless tobacco: Never Used   Substance Use Topics    Alcohol use: Not Currently     Alcohol/week: 0.0 standard drinks     Comment: occasional    Drug use: No     Review of Systems   Unable to perform ROS: Patient nonverbal   Musculoskeletal: Positive for arthralgias.       Physical Exam     Initial Vitals   BP Pulse Resp Temp SpO2   01/08/22 2326 01/08/22 2326 01/08/22 2326 01/08/22 2331 01/08/22 2326   (!) 145/65 66 16 98.4 °F (36.9 °C) 100 %      MAP       --                Physical Exam    Nursing note and vitals reviewed.  Constitutional: He appears  well-developed and well-nourished. He is not diaphoretic. No distress.   HENT:   Head: Normocephalic and atraumatic.   Right Ear: External ear normal.   Left Ear: External ear normal.   Nose: Nose normal.   Mouth/Throat: Oropharynx is clear and moist. No oropharyngeal exudate.   Eyes: Conjunctivae and EOM are normal. Pupils are equal, round, and reactive to light. Right eye exhibits no discharge. Left eye exhibits no discharge. No scleral icterus.   Neck: Neck supple. No thyromegaly present. No tracheal deviation present. No JVD present.   Normal range of motion.  Cardiovascular: Normal rate, regular rhythm, normal heart sounds and intact distal pulses. Exam reveals no gallop and no friction rub.    No murmur heard.  Pulmonary/Chest: Breath sounds normal. No stridor. No respiratory distress. He has no wheezes. He has no rhonchi. He has no rales. He exhibits no tenderness.   Abdominal: Abdomen is soft. Bowel sounds are normal. He exhibits no distension and no mass. There is no abdominal tenderness. There is no rebound and no guarding.   Musculoskeletal:         General: Tenderness present. No edema. Normal range of motion.      Cervical back: Normal range of motion and neck supple.      Comments: Patient has bruising and tenderness to the lateral aspect of the right knee patient has full range of motion no ligamentous laxity no obvious bony deformity.     Lymphadenopathy:     He has no cervical adenopathy.   Neurological: He is alert. He has normal strength. No cranial nerve deficit or sensory deficit.   Patient alert but unable to assess orientation as patient is essentially nonverbal   Skin: Skin is warm and dry. No rash noted. No erythema.         ED Course   Procedures  Labs Reviewed   B-TYPE NATRIURETIC PEPTIDE - Abnormal; Notable for the following components:       Result Value     (*)     All other components within normal limits   CBC W/ AUTO DIFFERENTIAL - Abnormal; Notable for the following  components:    WBC 15.52 (*)     RBC 3.67 (*)     Hemoglobin 11.7 (*)     Hematocrit 34.6 (*)     MCH 31.9 (*)     Gran # (ANC) 14.1 (*)     Immature Grans (Abs) 0.06 (*)     Lymph # 0.7 (*)     Gran % 90.8 (*)     Lymph % 4.6 (*)     Mono % 3.9 (*)     All other components within normal limits   COMPREHENSIVE METABOLIC PANEL - Abnormal; Notable for the following components:    Glucose 159 (*)     Total Bilirubin 1.1 (*)     ALT 7 (*)     All other components within normal limits   SARS-COV-2 RNA AMPLIFICATION, QUAL   LACTIC ACID, PLASMA   TROPONIN I   TSH   CK   URINALYSIS, REFLEX TO URINE CULTURE          Imaging Results          CT Pelvis Without Contrast (Final result)  Result time 01/09/22 00:33:56    Final result by Steve Palma MD (01/09/22 00:33:56)                 Narrative:    CT PELVIS WITHOUT CONTRAST  RPID: CT PELVIS WITHOUT IV CONTRAST    CLINICAL INDICATION: 76 years old Male with a history of Pelvic trauma  COMPARISON: None  TECHNIQUE: Axial CT of the pelvis was conducted without IV contrast. From this data, sagittal and coronal reformatted images were generated.  RADIATION DOSE REDUCTION: This exam was performed according to our departmental dose-optimization program, which includes automated exposure control, adjustment of the mA and kV according to patient size, and the use of iterative reconstruction technique if available.    FINDINGS:    There is a transverse fracture through the greater trochanter of the right femur. This is approximately 3 x 1.6 x 1.6 cm. Approximately 5 mm separation of the fracture fragments is observed.    IMPRESSION:  There is a transverse fracture of the greater trochanter of the right femur. No additional fractures are seen.      Electronically signed by:  Steve Palma MD  1/9/2022 12:33 AM [facility] Workstation: [id number]                             CT Cervical Spine Without Contrast (Final result)  Result time 01/09/22 00:26:50    Final result by Steve Palma MD  (01/09/22 00:26:50)                 Narrative:    CT CERVICAL SPINE WITHOUT CONTRAST  RPID: CT CERVICAL SPINE WITHOUT IV CONTRAST    HISTORY: 76 years old Male with Neck trauma (Age >= 65y),    COMPARISON: None.    TECHNIQUE: CT scan of the cervical spine was performed without  IV contrast.  RADIATION DOSE REDUCTION: This exam was performed according to our departmental dose-optimization program, which includes automated exposure control, adjustment of the mA and kV according to patient size, and the use of iterative reconstruction technique if available.    FINDINGS:  There are degenerative changes in the cervical spine, particularly at the C5-6 disc space level. There is no fracture. The craniocervical junction shows mild degenerative changes. The prevertebral soft tissues appear normal.      IMPRESSION:  Degenerative changes are observed, there is no fracture seen.    Electronically signed by:  Steve Palma MD  1/9/2022 12:26 AM [facility] Workstation: [id number]                             CT Head Without Contrast (Final result)  Result time 01/09/22 00:28:26    Final result by Nazario Steve DO (01/09/22 00:28:26)                 Narrative:    CLINICAL HISTORY: Head trauma and pain    COMPARISON: None    TECHNIQUE: Noncontrast axial CT of the brain was performed. Coronal and sagittal reformatted images were obtained. This exam was performed according to our departmental dose-optimization program, which includes automated exposure control, adjustment of the mA and/or kV according to patient size, and/or use of iterative reconstruction technique.    FINDINGS:  There is no midline shift, mass effect, or abnormal extra-axial fluid collection. There is no evidence of acute intracranial hemorrhage, mass, or cerebral edema. There are hypodense foci in the basal ganglia bilaterally suggestive of chronic lacunar infarctions. Mild scattered periventricular and deep white matter hypodensities, likely chronic microvascular  ischemic disease. The gray-white matter junction is preserved. Moderate prominence of the lateral ventricles and cortical sulci suggestive of diffuse cerebral volume loss, age-appropriate. Scattered vascular calcifications. The visualized paranasal sinuses and mastoid air cells are clear. The calvarium is intact.    IMPRESSION:  1. No acute intracranial process.    2. Mild chronic microvascular ischemic disease and chronic lacunar infarctions of the basal ganglia bilaterally.    Electronically signed by:  Nazario Steve DO  1/9/2022 12:28 AM [facility] Workstation: [id number]                             X-Ray Knee Complete 4 or more Views Right (Final result)  Result time 01/09/22 00:27:54   Procedure changed from X-Ray Knee 3 View Right     Final result by Steve Palma MD (01/09/22 00:27:54)                 Narrative:    XR KNEE COMP 4 OR MORE VIEWS RIGHT  RPID: XR KNEE 4 OR MORE VIEWS    CLINICAL HISTORY:  76 years  old Male,   trauma    COMPARISON:  None  VIEW COUNT: 4  FINDINGS:  Chondrocalcinosis of the lateral compartment is observed. There are vascular calcifications. There is no evidence of an acute fracture seen. There are mild degenerative osteoarthritic changes.    IMPRESSION:  There are mild degenerative changes, no evidence of a fracture seen.    Electronically signed by:  Steve Palma MD  1/9/2022 12:27 AM [facility] Workstation: [id number]                             X-Ray Hip with Pelvis when performed, 2 or 3 views Right (Final result)  Result time 01/09/22 00:30:57    Final result by Steve Palma MD (01/09/22 00:30:57)                 Narrative:    XR HIP WITH PELVIS WHEN PERFORMED, 2 OR 3  VIEWS RIGHT  RPID: Right hip and pelvis    CLINICAL HISTORY:  76 years old Male,  COMPARISON:  None    Views: 3    FINDINGS:  A transverse fracture through the greater trochanter is observed. Slight separation of fracture fragments is noted. There are mild degenerative changes of both hips.      IMPRESSION:  There is a  fracture the greater trochanter of the right hip observed, the femoral neck and head appear unremarkable.    Electronically signed by:  Steve Palma MD  1/9/2022 12:30 AM [facility] Workstation: [id number]                             X-Ray Chest AP Portable (Final result)  Result time 01/09/22 00:35:06    Final result by Steve Palma MD (01/09/22 00:35:06)                 Narrative:    Of XR CHEST AP PORTABLE  RPID: XR CHEST 1 VIEW    CLINICAL HISTORY:  76 years old Male with fall Pain to right hip and right knee after fall    COMPARISON:  9/20/2018  Number of views: 1  FINDINGS:  The cardiomediastinal silhouette is unchanged. There is no focal acute infiltrate is seen. No pleural fluid is observed.    A c-collar is present.    IMPRESSION:  No acute cardiothoracic process is observed. Her    Electronically signed by:  Steve Palma MD  1/9/2022 12:35 AM [facility] Workstation: [id number]                               Medications - No data to display  Medical Decision Making:   History:   Old Medical Records: I decided to obtain old medical records.  Initial Assessment:   Emergent evaluation of a 76-year-old male presenting with fall differential diagnosis includes soft tissue injury, fracture, intracranial injury, electrolyte abnormality, endocrine dysfunction, infection            Attending Attestation:             Attending ED Notes:   Patient has findings consistent with fracture to the right greater trochanter I discussed the case with orthopedics patient will be admitted to Internal Medicine for pain control and physical therapy/mobility training orthopedics will follow               Clinical Impression:   Final diagnoses:  [W19.XXXA] Fall  [S72.001A] Closed fracture of right hip, initial encounter (Primary)          ED Disposition Condition    Admit               Anthony Faust MD  01/09/22 1545

## 2022-01-09 NOTE — H&P
[facility] Medicine History & Physical Examination   Patient Name: Jonathon Pantoja  MRN: [id number]  Patient Class: IP- Inpatient   Admission Date: 1/8/2022 10:19 PM  Length of Stay: 0  Attending Physician: Lucio Matthew MD  Primary Care Provider: Nan Mo NP  Face-to-Face encounter date: 01/09/2022  Code Status: full code  Chief Complaint: Fall ([facility]. Normally only oriented to himself. )          Patient information was obtained from patient, past medical records and ER records.   HISTORY OF PRESENT ILLNESS:   History was obtained from the family present at the bedside and ER physician Sign-out.  Patient is a 76-year-old male with a history of hypertension, hyperlipidemia, Parkinson's disease, dementia who presents to the ED after fall.  He is a resident at the [facility].  He had a unwitnessed fall and found on the ground.  It is unknown if he had loss of consciousness.  Per the family at the bedside the patient is currently at his mental baseline.  The daughter reports the patient has days where he communicates with them and other days he is nonverbal.  He is usually oriented to self.  He reported pain to his right knee, and bruising noted to the right leg.     In the ED patient is afebrile.  Vitals stable  CBC with elevated WBC at 15   CMP with glucose 159, otherwise unremarkable  BNP elevated 191 and Troponin WNL  CPK WNL   EKG shows normal sinus rhythm.  No ST elevation  CXR with no acute cardiopulmonary process.  X-ray right hip shows a fracture to the greater trochanter of the right hip.  The femoral neck and head appear unremarkable.  X-ray right knee with mild degenerative changes, but no evidence of acute fracture.  CT head with no acute intracranial process.  There are mi chronic microvascular ischemic disease and chronic lacunar infarcts of the basal ganglia bilaterally CT of C-spine with no acute fracture.  CT pelvis confirms transverse  fracture of the greater trochanter of the right femur.      REVIEW OF SYSTEMS:   Unable to perform ROS due to patient's mental status    PAST MEDICAL HISTORY:     Past Medical History:   Diagnosis Date    Abnormal fasting glucose     Hyperlipidemia     Hypertension     Parkinson's disease        PAST SURGICAL HISTORY:     Past Surgical History:   Procedure Laterality Date    INGUINAL HERNIA REPAIR Bilateral        ALLERGIES:   Patient has no known allergies.    FAMILY HISTORY:     Family History   Problem Relation Age of Onset    Alzheimer's disease Mother 94    Heart attack Father 50    Dementia Brother 74    Heart attack Brother 50    Diabetes Brother     Heart disease Brother     Hypertension Brother        SOCIAL HISTORY:     Social History     Tobacco Use    Smoking status: Former Smoker     Packs/day: 2.00     Years: 25.00     Pack years: 50.00     Types: Cigarettes, Cigars     Start date: 10/8/1969     Quit date: 10/8/2006     Years since quitting: 15.2    Smokeless tobacco: Never Used   Substance Use Topics    Alcohol use: Not Currently     Alcohol/week: 0.0 standard drinks     Comment: occasional        Social History     Substance and Sexual Activity   Sexual Activity Not Currently        HOME MEDICATIONS:     Prior to Admission medications    Medication Sig Start Date End Date Taking? Authorizing Provider   amLODIPine (NORVASC) 5 MG tablet Take 1 tablet (5 mg total) by mouth 2 (two) times daily. 11/16/21 11/16/22  Raghu Gray MD   aspirin (ECOTRIN) 81 MG EC tablet Take 1 tablet by mouth once daily.    Historical Provider   atenoloL (TENORMIN) 50 MG tablet Take 1 tablet (50 mg total) by mouth once daily. 11/18/21   Raghu Gray MD   carbidopa-levodopa  mg (SINEMET)  mg per tablet Take 1 tablet by mouth 4 (four) times daily.  8/7/18   Historical Provider   clonazePAM (KLONOPIN) 0.5 MG tablet Take 1 tablet (0.5 mg total) by mouth 2 (two) times daily as needed for  "Anxiety. 4/7/21   Raghu Gray MD   donepezil (ARICEPT) 10 MG tablet Take 1 tablet by mouth once daily. 9/10/18   Historical Provider   irbesartan (AVAPRO) 300 MG tablet Take 1 tablet (300 mg total) by mouth every evening. 11/18/21   Raghu Gray MD   istradefylline (NOURIANZ) 20 mg Tab Take 1 tablet by mouth once daily. 10/14/20   Historical Provider   melatonin 10 mg Tab Take 1 tablet by mouth every evening.    Historical Provider   multivitamin (THERAGRAN) per tablet Take 1 tablet by mouth once daily.    Historical Provider   pravastatin (PRAVACHOL) 40 MG tablet Take 1 tablet (40 mg total) by mouth once daily. 11/18/21   Raghu Gray MD         PHYSICAL EXAM:   BP (!) 145/66   Pulse 63   Temp 98.4 °F (36.9 °C) (Oral)   Resp 16   Ht 5' 6" (1.676 m)   Wt 59 kg (130 lb)   SpO2 99%   BMI 20.98 kg/m²   Vitals Reviewed  General appearance: Well-developed, well-nourished, elderly White male  Skin: No Rash.  Warm, dry  Neuro:  Awake alert.  Makes eye contact.  Nonverbal at this time.  Follows commands.  Motor and sensory exams grossly intact.  Generalized weakness.  HENT: Atraumatic head. Moist mucous membranes of oral cavity.  Eyes: Normal extraocular movements.   Neck: Supple. No evidence of lymphadenopathy. No thyroidomegaly.  Lungs: Clear to auscultation bilaterally. No wheezing present.   Heart: Regular rate and rhythm. S1 and S2 present with no murmurs/gallop/rub. No pedal edema. No JVD present.   Abdomen: Soft, non-distended, non-tender. No rebound tenderness/guarding. No masses or organomegaly. Bowel sounds are normal. Bladder is not palpable.   Extremities: No cyanosis, clubbing.  Cap refill less than 2 seconds  Psych/mental status:  Flat affect. Cooperative.  Nonverbal   EMERGENCY DEPARTMENT LABS AND IMAGING:     Labs Reviewed   B-TYPE NATRIURETIC PEPTIDE - Abnormal; Notable for the following components:       Result Value     (*)     All other components within normal limits   CBC " W/ AUTO DIFFERENTIAL - Abnormal; Notable for the following components:    WBC 15.52 (*)     RBC 3.67 (*)     Hemoglobin 11.7 (*)     Hematocrit 34.6 (*)     MCH 31.9 (*)     Gran # (ANC) 14.1 (*)     Immature Grans (Abs) 0.06 (*)     Lymph # 0.7 (*)     Gran % 90.8 (*)     Lymph % 4.6 (*)     Mono % 3.9 (*)     All other components within normal limits   COMPREHENSIVE METABOLIC PANEL - Abnormal; Notable for the following components:    Glucose 159 (*)     Total Bilirubin 1.1 (*)     ALT 7 (*)     All other components within normal limits   SARS-COV-2 RNA AMPLIFICATION, QUAL   LACTIC ACID, PLASMA   TROPONIN I   TSH   CK   URINALYSIS, REFLEX TO URINE CULTURE       CT Pelvis Without Contrast   Final Result      CT Cervical Spine Without Contrast   Final Result      CT Head Without Contrast   Final Result      X-Ray Knee Complete 4 or more Views Right   Final Result      X-Ray Hip with Pelvis when performed, 2 or 3 views Right   Final Result      X-Ray Chest AP Portable   Final Result      X-Ray Pelvis Routine AP    (Results Pending)       ASSESSMENT & PLAN:   Jonathon Pantoja is a 76 y.o. male admitted for    Active problems/reason for admission  Acute transverse fracture of the greater trochanter of the right femur secondary to fall        Chronic problems  Hypertension  Hyperlipidemia  Parkinson's        Plan  Admit to med tele  P.r.n. analgesics  PT consult  Ortho consult  Case management consult as patient lives at assisted living and may now need inpatient rehab/SNF  Continue home meds as ordered for chronic conditions      Diet: Cardiac    DVT Prophylaxis:  Mechanical DVT prophylaxis. Encourage ambulation and OOB as tolerated.     Discharge Planning and Disposition:  Patient will be discharged in 2-3 days  ________________________________________________________________________________    This patient is high risk for life-threatening deterioration and death secondary to above comorbidities and need for IV  treatment. This patient meets inpatient criteria.   Face-to-Face encounter date: 01/09/2022  Encounter included review of the medical records, interviewing and examining the patient face-to-face, discussion with family and other health care providers including emergency medicine physician, admission orders, interpreting lab/test results and formulating a plan of care.   Medical Decision Making during this encounter was  [_] Low Complexity  [_] Moderate Complexity  [x] High Complexity  _________________________________________________________________________________    INPATIENT LIST OF MEDICATIONS   No current facility-administered medications for this encounter.    Current Outpatient Medications:     amLODIPine (NORVASC) 5 MG tablet, Take 1 tablet (5 mg total) by mouth 2 (two) times daily., Disp: 180 tablet, Rfl: 1    aspirin (ECOTRIN) 81 MG EC tablet, Take 1 tablet by mouth once daily., Disp: , Rfl:     atenoloL (TENORMIN) 50 MG tablet, Take 1 tablet (50 mg total) by mouth once daily., Disp: 90 tablet, Rfl: 1    carbidopa-levodopa  mg (SINEMET)  mg per tablet, Take 1 tablet by mouth 4 (four) times daily. , Disp: , Rfl:     clonazePAM (KLONOPIN) 0.5 MG tablet, Take 1 tablet (0.5 mg total) by mouth 2 (two) times daily as needed for Anxiety., Disp: 180 tablet, Rfl: 1    donepezil (ARICEPT) 10 MG tablet, Take 1 tablet by mouth once daily., Disp: , Rfl:     irbesartan (AVAPRO) 300 MG tablet, Take 1 tablet (300 mg total) by mouth every evening., Disp: 90 tablet, Rfl: 1    istradefylline (NOURIANZ) 20 mg Tab, Take 1 tablet by mouth once daily., Disp: , Rfl:     melatonin 10 mg Tab, Take 1 tablet by mouth every evening., Disp: , Rfl:     multivitamin (THERAGRAN) per tablet, Take 1 tablet by mouth once daily., Disp: , Rfl:     pravastatin (PRAVACHOL) 40 MG tablet, Take 1 tablet (40 mg total) by mouth once daily., Disp: 90 tablet, Rfl: 1      Scheduled Meds:    Continuous Infusions:  PRN  Meds:.      Gabbi Reyes  [facility] Hospitalist  01/09/2022

## 2022-01-09 NOTE — PT/OT/SLP PROGRESS
Physical Therapy      Patient Name:  Jonathon Pantoja   MRN:  [id number]    Patient not seen today secondary to Other (Comment) (Initially awaiting ortho consult, in afternoon RN report patient on bed rest til 1/10/22. Will follow up at that time.). Will follow-up 1/10/22.

## 2022-01-09 NOTE — SUBJECTIVE & OBJECTIVE
Past Medical History:   Diagnosis Date    Abnormal fasting glucose     Hyperlipidemia     Hypertension     Parkinson's disease        Past Surgical History:   Procedure Laterality Date    INGUINAL HERNIA REPAIR Bilateral        Review of patient's allergies indicates:  No Known Allergies    Current Facility-Administered Medications   Medication    acetaminophen tablet 650 mg    amLODIPine tablet 5 mg    aspirin EC tablet 81 mg    atenoloL tablet 50 mg    carbidopa-levodopa  mg per tablet 1 tablet    clonazePAM tablet 0.5 mg    donepeziL tablet 10 mg    HYDROcodone-acetaminophen 5-325 mg per tablet 1 tablet    istradefylline Tab 1 tablet    losartan tablet 100 mg    melatonin tablet 9 mg    morphine injection 2 mg    multivitamin tablet    naloxone 0.4 mg/mL injection 0.02 mg    ondansetron injection 4 mg    pravastatin tablet 40 mg    senna-docusate 8.6-50 mg per tablet 1 tablet    sodium chloride 0.9% flush 10 mL     Family History     Problem Relation (Age of Onset)    Alzheimer's disease Mother (94)    Dementia Brother (74)    Diabetes Brother    Heart attack Father (50), Brother (50)    Heart disease Brother    Hypertension Brother        Tobacco Use    Smoking status: Former Smoker     Packs/day: 2.00     Years: 25.00     Pack years: 50.00     Types: Cigarettes, Cigars     Start date: 10/8/1969     Quit date: 10/8/2006     Years since quitting: 15.2    Smokeless tobacco: Never Used   Substance and Sexual Activity    Alcohol use: Not Currently     Alcohol/week: 0.0 standard drinks     Comment: occasional    Drug use: No    Sexual activity: Not Currently     Review of Systems   Unable to perform ROS: dementia     Objective:     Vital Signs (Most Recent):  Temp: 98 °F (36.7 °C) (01/09/22 0911)  Pulse: 64 (01/09/22 0911)  Resp: 20 (01/09/22 0911)  BP: 124/60 (01/09/22 0911)  SpO2: 98 % (01/09/22 0911) Vital Signs (24h Range):  Temp:  [98 °F (36.7 °C)-98.6 °F (37 °C)] 98 °F (36.7  "°C)  Pulse:  [63-96] 64  Resp:  [16-20] 20  SpO2:  [97 %-100 %] 98 %  BP: (124-158)/(60-70) 124/60     Weight: 57.9 kg (127 lb 10.3 oz)  Height: 5' 5" (165.1 cm)  Body mass index is 21.24 kg/m².      Intake/Output Summary (Last 24 hours) at 1/9/2022 1058  Last data filed at 1/9/2022 0911  Gross per 24 hour   Intake 240 ml   Output --   Net 240 ml       General    Nursing note and vitals reviewed.  Constitutional: He appears well-developed and well-nourished. No distress.   HENT:   Head: Atraumatic.   Nose: Nose normal.   Eyes: EOM are normal.   Cardiovascular: Normal rate and regular rhythm.    Pulmonary/Chest: Effort normal.   Abdominal: Soft.   Neurological: He is alert.             Right Hip Exam     Inspection   Swelling: present  Erythema: absent    Tenderness   The patient tender to palpation of the trochanteric bursa.    Range of Motion   Abduction: abnormal   Adduction: abnormal   Extension: abnormal   Flexion: abnormal   External rotation: abnormal   Internal rotation: abnormal     Tests   Log Roll: positive    Other   Sensation: normal  Left Hip Exam   Left hip exam is normal.          Vascular Exam     Right Pulses  Dorsalis Pedis:      2+              Significant Labs:   BMP:   Recent Labs   Lab 01/09/22  0843   *      K 3.7      CO2 26   BUN 12   CREATININE 0.4*   CALCIUM 8.8   MG 2.1     CBC:   Recent Labs   Lab 01/08/22  2332 01/09/22  0843   WBC 15.52* 11.74   HGB 11.7* 11.9*   HCT 34.6* 35.5*    273     CMP:   Recent Labs   Lab 01/08/22  2332 01/09/22  0843    141   K 4.0 3.7    105   CO2 26 26   * 153*   BUN 15 12   CREATININE 0.6 0.4*   CALCIUM 8.9 8.8   PROT 6.7  --    ALBUMIN 3.7  --    BILITOT 1.1*  --    ALKPHOS 74  --    AST 19  --    ALT 7*  --    ANIONGAP 10 10   EGFRNONAA >60.0 >60.0     All pertinent labs within the past 24 hours have been reviewed.    Significant Imaging: CT: I have reviewed all pertinent results/findings and my personal " findings are:  CT scan of the hip is reviewed which show a transverse fracture of the greater trochanter without involvement of the inter troch region or femoral neck.

## 2022-01-09 NOTE — ASSESSMENT & PLAN NOTE
Patient has a stable fracture that is non operative in nature.  Patient may weight bear as tolerated using a walker or assistive device.  Patient might benefit from continued therapy at facility.

## 2022-01-09 NOTE — CONSULTS
[facility]  Orthopedics  Consult Note    Patient Name: Jonathon Pantoja  MRN: [id number]  Admission Date: 1/8/2022  Hospital Length of Stay: 0 days  Attending Provider: Peter Mendez MD  Primary Care Provider: Nan Mo NP    Patient information was obtained from past medical records and ER records.     Inpatient consult to Orthopedic Surgery  Consult performed by: Solomon Marie MD  Consult ordered by: Anthony Faust MD  Reason for consult: R greater troch fracture  Assessment/Recommendations: Nonop        Subjective:     Principal Problem:Closed fracture of right hip    Chief Complaint:   Chief Complaint   Patient presents with    [facility]. Normally only oriented to himself.         HPI: History was obtained from the family present at the bedside and ER physician Sign-out.  Patient is a 76-year-old male with a history of hypertension, hyperlipidemia, Parkinson's disease, dementia who presents to the ED after fall.  He is a resident at the [facility].  He had a unwitnessed fall and found on the ground.  It is unknown if he had loss of consciousness.  Per the family at the bedside the patient is currently at his mental baseline.  The daughter reports the patient has days where he communicates with them and other days he is nonverbal.  He is usually oriented to self.  He reported pain to his right knee, and bruising noted to the right leg.      In the ED patient is afebrile.  Vitals stable  CBC with elevated WBC at 15   CMP with glucose 159, otherwise unremarkable  BNP elevated 191 and Troponin WNL  CPK WNL   EKG shows normal sinus rhythm.  No ST elevation  CXR with no acute cardiopulmonary process.  X-ray right hip shows a fracture to the greater trochanter of the right hip.  The femoral neck and head appear unremarkable.  X-ray right knee with mild degenerative changes, but no evidence of acute fracture.  CT head with no acute intracranial process.   There are mi chronic microvascular ischemic disease and chronic lacunar infarcts of the basal ganglia bilaterally CT of C-spine with no acute fracture.  CT pelvis confirms transverse fracture of the greater trochanter of the right femur.      Past Medical History:   Diagnosis Date    Abnormal fasting glucose     Hyperlipidemia     Hypertension     Parkinson's disease        Past Surgical History:   Procedure Laterality Date    INGUINAL HERNIA REPAIR Bilateral        Review of patient's allergies indicates:  No Known Allergies    Current Facility-Administered Medications   Medication    acetaminophen tablet 650 mg    amLODIPine tablet 5 mg    aspirin EC tablet 81 mg    atenoloL tablet 50 mg    carbidopa-levodopa  mg per tablet 1 tablet    clonazePAM tablet 0.5 mg    donepeziL tablet 10 mg    HYDROcodone-acetaminophen 5-325 mg per tablet 1 tablet    istradefylline Tab 1 tablet    losartan tablet 100 mg    melatonin tablet 9 mg    morphine injection 2 mg    multivitamin tablet    naloxone 0.4 mg/mL injection 0.02 mg    ondansetron injection 4 mg    pravastatin tablet 40 mg    senna-docusate 8.6-50 mg per tablet 1 tablet    sodium chloride 0.9% flush 10 mL     Family History     Problem Relation (Age of Onset)    Alzheimer's disease Mother (94)    Dementia Brother (74)    Diabetes Brother    Heart attack Father (50), Brother (50)    Heart disease Brother    Hypertension Brother        Tobacco Use    Smoking status: Former Smoker     Packs/day: 2.00     Years: 25.00     Pack years: 50.00     Types: Cigarettes, Cigars     Start date: 10/8/1969     Quit date: 10/8/2006     Years since quitting: 15.2    Smokeless tobacco: Never Used   Substance and Sexual Activity    Alcohol use: Not Currently     Alcohol/week: 0.0 standard drinks     Comment: occasional    Drug use: No    Sexual activity: Not Currently     Review of Systems   Unable to perform ROS: dementia     Objective:     Vital Signs  "(Most Recent):  Temp: 98 °F (36.7 °C) (01/09/22 0911)  Pulse: 64 (01/09/22 0911)  Resp: 20 (01/09/22 0911)  BP: 124/60 (01/09/22 0911)  SpO2: 98 % (01/09/22 0911) Vital Signs (24h Range):  Temp:  [98 °F (36.7 °C)-98.6 °F (37 °C)] 98 °F (36.7 °C)  Pulse:  [63-96] 64  Resp:  [16-20] 20  SpO2:  [97 %-100 %] 98 %  BP: (124-158)/(60-70) 124/60     Weight: 57.9 kg (127 lb 10.3 oz)  Height: 5' 5" (165.1 cm)  Body mass index is 21.24 kg/m².      Intake/Output Summary (Last 24 hours) at 1/9/2022 1058  Last data filed at 1/9/2022 0911  Gross per 24 hour   Intake 240 ml   Output --   Net 240 ml       General    Nursing note and vitals reviewed.  Constitutional: He appears well-developed and well-nourished. No distress.   HENT:   Head: Atraumatic.   Nose: Nose normal.   Eyes: EOM are normal.   Cardiovascular: Normal rate and regular rhythm.    Pulmonary/Chest: Effort normal.   Abdominal: Soft.   Neurological: He is alert.             Right Hip Exam     Inspection   Swelling: present  Erythema: absent    Tenderness   The patient tender to palpation of the trochanteric bursa.    Range of Motion   Abduction: abnormal   Adduction: abnormal   Extension: abnormal   Flexion: abnormal   External rotation: abnormal   Internal rotation: abnormal     Tests   Log Roll: positive    Other   Sensation: normal  Left Hip Exam   Left hip exam is normal.          Vascular Exam     Right Pulses  Dorsalis Pedis:      2+              Significant Labs:   BMP:   Recent Labs   Lab 01/09/22  0843   *      K 3.7      CO2 26   BUN 12   CREATININE 0.4*   CALCIUM 8.8   MG 2.1     CBC:   Recent Labs   Lab 01/08/22 2332 01/09/22 0843   WBC 15.52* 11.74   HGB 11.7* 11.9*   HCT 34.6* 35.5*    273     CMP:   Recent Labs   Lab 01/08/22  2332 01/09/22  0843    141   K 4.0 3.7    105   CO2 26 26   * 153*   BUN 15 12   CREATININE 0.6 0.4*   CALCIUM 8.9 8.8   PROT 6.7  --    ALBUMIN 3.7  --    BILITOT 1.1*  --    ALKPHOS " 74  --    AST 19  --    ALT 7*  --    ANIONGAP 10 10   EGFRNONAA >60.0 >60.0     All pertinent labs within the past 24 hours have been reviewed.    Significant Imaging: CT: I have reviewed all pertinent results/findings and my personal findings are:  CT scan of the hip is reviewed which show a transverse fracture of the greater trochanter without involvement of the inter troch region or femoral neck.    Assessment/Plan:     * Closed fracture of right hip  Patient has a stable fracture that is non operative in nature.  Patient may weight bear as tolerated using a walker or assistive device.  Patient might benefit from continued therapy at facility.        Thank you for your consult. I will follow-up with patient. Please contact us if you have any additional questions.    Solomon Marie MD  Orthopedics  [facility]

## 2022-01-09 NOTE — HPI
History was obtained from the family present at the bedside and ER physician Sign-out.  Patient is a 76-year-old male with a history of hypertension, hyperlipidemia, Parkinson's disease, dementia who presents to the ED after fall.  He is a resident at the [facility].  He had a unwitnessed fall and found on the ground.  It is unknown if he had loss of consciousness.  Per the family at the bedside the patient is currently at his mental baseline.  The daughter reports the patient has days where he communicates with them and other days he is nonverbal.  He is usually oriented to self.  He reported pain to his right knee, and bruising noted to the right leg.      In the ED patient is afebrile.  Vitals stable  CBC with elevated WBC at 15   CMP with glucose 159, otherwise unremarkable  BNP elevated 191 and Troponin WNL  CPK WNL   EKG shows normal sinus rhythm.  No ST elevation  CXR with no acute cardiopulmonary process.  X-ray right hip shows a fracture to the greater trochanter of the right hip.  The femoral neck and head appear unremarkable.  X-ray right knee with mild degenerative changes, but no evidence of acute fracture.  CT head with no acute intracranial process.  There are mi chronic microvascular ischemic disease and chronic lacunar infarcts of the basal ganglia bilaterally CT of C-spine with no acute fracture.  CT pelvis confirms transverse fracture of the greater trochanter of the right femur.

## 2022-01-10 LAB
ANION GAP SERPL CALC-SCNC: 11 MMOL/L (ref 8–16)
BACTERIA #/AREA URNS HPF: ABNORMAL /HPF
BASOPHILS # BLD AUTO: 0.06 K/UL (ref 0–0.2)
BASOPHILS NFR BLD: 0.5 % (ref 0–1.9)
BILIRUB UR QL STRIP: NEGATIVE
BUN SERPL-MCNC: 10 MG/DL (ref 8–23)
CALCIUM SERPL-MCNC: 8.8 MG/DL (ref 8.7–10.5)
CHLORIDE SERPL-SCNC: 104 MMOL/L (ref 95–110)
CLARITY UR: ABNORMAL
CO2 SERPL-SCNC: 24 MMOL/L (ref 23–29)
COLOR UR: YELLOW
CREAT SERPL-MCNC: 0.5 MG/DL (ref 0.5–1.4)
DIFFERENTIAL METHOD: ABNORMAL
EOSINOPHIL # BLD AUTO: 0 K/UL (ref 0–0.5)
EOSINOPHIL NFR BLD: 0.2 % (ref 0–8)
ERYTHROCYTE [DISTWIDTH] IN BLOOD BY AUTOMATED COUNT: 13.1 % (ref 11.5–14.5)
EST. GFR  (AFRICAN AMERICAN): >60 ML/MIN/1.73 M^2
EST. GFR  (NON AFRICAN AMERICAN): >60 ML/MIN/1.73 M^2
GLUCOSE SERPL-MCNC: 119 MG/DL (ref 70–110)
GLUCOSE UR QL STRIP: NEGATIVE
HCT VFR BLD AUTO: 36.2 % (ref 40–54)
HGB BLD-MCNC: 12.5 G/DL (ref 14–18)
HGB UR QL STRIP: NEGATIVE
HYALINE CASTS #/AREA URNS LPF: 41 /LPF
IMM GRANULOCYTES # BLD AUTO: 0.06 K/UL (ref 0–0.04)
IMM GRANULOCYTES NFR BLD AUTO: 0.5 % (ref 0–0.5)
KETONES UR QL STRIP: NEGATIVE
LEUKOCYTE ESTERASE UR QL STRIP: ABNORMAL
LYMPHOCYTES # BLD AUTO: 1.3 K/UL (ref 1–4.8)
LYMPHOCYTES NFR BLD: 10.2 % (ref 18–48)
MAGNESIUM SERPL-MCNC: 2 MG/DL (ref 1.6–2.6)
MCH RBC QN AUTO: 32.2 PG (ref 27–31)
MCHC RBC AUTO-ENTMCNC: 34.5 G/DL (ref 32–36)
MCV RBC AUTO: 93 FL (ref 82–98)
MICROSCOPIC COMMENT: ABNORMAL
MONOCYTES # BLD AUTO: 0.9 K/UL (ref 0.3–1)
MONOCYTES NFR BLD: 6.9 % (ref 4–15)
NEUTROPHILS # BLD AUTO: 10.2 K/UL (ref 1.8–7.7)
NEUTROPHILS NFR BLD: 81.7 % (ref 38–73)
NITRITE UR QL STRIP: NEGATIVE
NRBC BLD-RTO: 0 /100 WBC
PH UR STRIP: 7 [PH] (ref 5–8)
PLATELET # BLD AUTO: 280 K/UL (ref 150–450)
PMV BLD AUTO: 11.6 FL (ref 9.2–12.9)
POTASSIUM SERPL-SCNC: 3.8 MMOL/L (ref 3.5–5.1)
PROT UR QL STRIP: ABNORMAL
RBC # BLD AUTO: 3.88 M/UL (ref 4.6–6.2)
RBC #/AREA URNS HPF: 8 /HPF (ref 0–4)
SODIUM SERPL-SCNC: 139 MMOL/L (ref 136–145)
SP GR UR STRIP: 1.02 (ref 1–1.03)
SQUAMOUS #/AREA URNS HPF: 0 /HPF
URN SPEC COLLECT METH UR: ABNORMAL
UROBILINOGEN UR STRIP-ACNC: NEGATIVE EU/DL
WBC # BLD AUTO: 12.5 K/UL (ref 3.9–12.7)
WBC #/AREA URNS HPF: 41 /HPF (ref 0–5)

## 2022-01-10 PROCEDURE — 63600175 PHARM REV CODE 636 W HCPCS: Performed by: INTERNAL MEDICINE

## 2022-01-10 PROCEDURE — 85025 COMPLETE CBC W/AUTO DIFF WBC: CPT | Performed by: NURSE PRACTITIONER

## 2022-01-10 PROCEDURE — 36415 COLL VENOUS BLD VENIPUNCTURE: CPT | Performed by: NURSE PRACTITIONER

## 2022-01-10 PROCEDURE — 92610 EVALUATE SWALLOWING FUNCTION: CPT

## 2022-01-10 PROCEDURE — 97162 PT EVAL MOD COMPLEX 30 MIN: CPT

## 2022-01-10 PROCEDURE — 81001 URINALYSIS AUTO W/SCOPE: CPT | Performed by: EMERGENCY MEDICINE

## 2022-01-10 PROCEDURE — 87086 URINE CULTURE/COLONY COUNT: CPT | Performed by: EMERGENCY MEDICINE

## 2022-01-10 PROCEDURE — 80048 BASIC METABOLIC PNL TOTAL CA: CPT | Performed by: NURSE PRACTITIONER

## 2022-01-10 PROCEDURE — 25000003 PHARM REV CODE 250: Performed by: INTERNAL MEDICINE

## 2022-01-10 PROCEDURE — 83735 ASSAY OF MAGNESIUM: CPT | Performed by: NURSE PRACTITIONER

## 2022-01-10 PROCEDURE — 21400001 HC TELEMETRY ROOM

## 2022-01-10 RX ORDER — SCOLOPAMINE TRANSDERMAL SYSTEM 1 MG/1
1 PATCH, EXTENDED RELEASE TRANSDERMAL
Status: DISCONTINUED | OUTPATIENT
Start: 2022-01-10 | End: 2022-01-12 | Stop reason: HOSPADM

## 2022-01-10 RX ADMIN — MORPHINE SULFATE 2 MG: 2 INJECTION, SOLUTION INTRAMUSCULAR; INTRAVENOUS at 05:01

## 2022-01-10 RX ADMIN — LEUCINE, PHENYLALANINE, LYSINE, METHIONINE, ISOLEUCINE, VALINE, HISTIDINE, THREONINE, TRYPTOPHAN, ALANINE, GLYCINE, ARGININE, PROLINE, SERINE, TYROSINE, DEXTROSE 2000 ML: 311; 238; 247; 170; 255; 247; 204; 179; 77; 880; 438; 489; 289; 213; 17; 5 INJECTION INTRAVENOUS at 05:01

## 2022-01-10 RX ADMIN — SCOPOLAMINE 1 PATCH: 1 PATCH TRANSDERMAL at 08:01

## 2022-01-10 RX ADMIN — MORPHINE SULFATE 2 MG: 2 INJECTION, SOLUTION INTRAMUSCULAR; INTRAVENOUS at 07:01

## 2022-01-10 NOTE — NURSING
Pt pocketing food in mouth, drooling present. Food removed and MD notified. New orders received, pt NPO for speech eval. HOB elevated, VSS, pt safety maintained.

## 2022-01-10 NOTE — PT/OT/SLP EVAL
Physical Therapy Evaluation    Patient Name:  Jonathon Pantoja   MRN:  [id number]    Recommendations:     Discharge Recommendations:  assisted living facility   Discharge Equipment Recommendations:  (assessing needs)   Barriers to discharge: None    Assessment:     Jonathon Panotja is a 76 y.o. male admitted with a medical diagnosis of Closed fracture of right hip.  He presents with the following impairments/functional limitations:  impaired cognition,decreased safety awareness,impaired functional mobilty,gait instability,impaired coordination,abnormal tone,impaired self care skills,impaired balance,orthopedic precautions,impaired joint extensibility Pt found supine in bed. Has pulled out his IV and half o f his telemetry wires and was busy trying to pull off his BP cuff. Pt is a poor historian. Limited verbalization. No family present . It is unclear what is mobility level was like at [facility]. Today he required mod A for bed mobility, min A to stand. He ambulated x 30 ft with RW min A for walker management and near constant VC's for wider JAYNE, increased stride. Pt without complaints of  pain and accepts weight ontoR LE without difficulty. Will trial PT .    Rehab Prognosis: Fair and Poor; patient would benefit from acute skilled PT services to address these deficits and reach maximum level of function.    Recent Surgery: * No surgery found *      Plan:     During this hospitalization, patient to be seen 5 x/week to address the identified rehab impairments via gait training,therapeutic activities,therapeutic exercises and progress toward the following goals:    · Plan of Care Expires:  02/10/22    Subjective     Chief Complaint: does not verbalize, states name only  Patient/Family Comments/goals: unable to state  Pain/Comfort:  · Pain Rating 1:  (no complaint sof pain, no signs)  · Pain Rating Post-Intervention 1: 0/10    Patients cultural, spiritual, Temple conflicts given the current situation:  no    Living Environment:  [facility], [place]  Prior to admission, patients level of function was unkown.  Equipment used at home:  (poor historian, unknown, no family present).  DME owned (not currently used): unknown.  Upon discharge, patient will have assistance from AL staff.    Objective:     Communicated with nurse prior to session.  Patient found HOB elevated with telemetry,peripheral IV,blood pressure cuff,bed alarm  upon PT entry to room.Nurse notified that patient had pulled out his IV    General Precautions: Standard, fall (Parkinsons dementia)   Orthopedic Precautions:RLE weight bearing as tolerated (no surgical intervention)   Braces: N/A  Respiratory Status: Room air    Exams:  · Cognitive Exam:  Patient is oriented to Person  · Gross Motor Coordination:  impaired  · RUE Strength: WFL  · LUE Strength: WFL  · RLE ROM: WFL  · RLE Strength: WFL  · LLE ROM: WFL  · LLE Strength: WFL    Functional Mobility:  · Bed Mobility:     · Supine to Sit: moderate assistance  · Sit to Supine: moderate assistance  · Transfers:     · Sit to Stand:  moderate assistance with rolling walker  · Gait: ambulated x 30 ft with RW min A for walker management and near constant VC's for wider JAYNE, increased stride. Pt without complaints of  pain and accepts weight onto R LE without difficulty.  · Balance: poor    AM-PAC 6 CLICK MOBILITY  Total Score:13     Patient left HOB elevated with all lines intact, call button in reach, bed alarm on and nurse notified.    GOALS:   Multidisciplinary Problems     Physical Therapy Goals        Problem: Physical Therapy Goal    Goal Priority Disciplines Outcome Goal Variances Interventions   Physical Therapy Goal     PT, PT/OT Ongoing, Progressing     Description: Goals to be met by: 02/10/22     Patient will increase functional independence with mobility by performin. Supine to sit with Stand-by Assistance  2. Sit to supine with Stand-by Assistance  3. Sit to stand transfer with  Contact Guard Assistance  4. Bed to chair transfer with Contact Guard Assistance using No Assistive Device  5. Gait  x 50 feet with Minimal Assistance using least restrictive AD                     History:     Past Medical History:   Diagnosis Date    Abnormal fasting glucose     Hyperlipidemia     Hypertension     Parkinson's disease        Past Surgical History:   Procedure Laterality Date    INGUINAL HERNIA REPAIR Bilateral        Time Tracking:     PT Received On: 01/10/22  PT Start Time: 0948     PT Stop Time: 1007  PT Total Time (min): 19 min     Billable Minutes: Evaluation 12 and Therapeutic Activity 7      01/10/2022

## 2022-01-10 NOTE — PLAN OF CARE
Problem: Physical Therapy Goal  Goal: Physical Therapy Goal  Description: Goals to be met by: 02/10/22     Patient will increase functional independence with mobility by performin. Supine to sit with Stand-by Assistance  2. Sit to supine with Stand-by Assistance  3. Sit to stand transfer with Contact Guard Assistance  4. Bed to chair transfer with Contact Guard Assistance using No Assistive Device  5. Gait  x 50 feet with Minimal Assistance using least restrictive AD    Outcome: POC established

## 2022-01-10 NOTE — PLAN OF CARE
[facility]  Initial Discharge Assessment       Primary Care Provider: Nan Mo NP    Admission Diagnosis: Closed fracture of right hip, initial encounter [S72.001A]    Admission Date: 1/8/2022  Expected Discharge Date:        Assessment completed: spoke with spouse at bedside  Advanced Directives: POA, Will, asked spouse to provide hospital with a copy  Marital Status: Marrued  Children: 1  Next of Kin: Spouse    Needs: TBD    Discharge Plan: return to [facility]    CM  will continue to follow.    Discharge Barriers Identified: None    Payor: MEDICARE / Plan: MEDICARE PART A & B / Product Type: Government /     Extended Emergency Contact Information  Primary Emergency Contact: Eleanor Pantoja   [facility]  Home Phone: [phone]  Mobile Phone: [phone]  Relation: Spouse    Discharge Plan A: Other ([facility])  Discharge Plan B: Other ([facility])      [facility] - [place] [address] [facility] [address]  Phone: [phone] Fax: [phone]    [facility] [address] [place], [place] - [address] [place] [address]  [address] [place] [address]  Phone: [phone] Fax: [phone]      Initial Assessment (most recent)     Adult Discharge Assessment - 01/10/22 1624        Discharge Assessment    Assessment Type Discharge Planning Assessment     Confirmed/corrected address, phone number and insurance Yes     Confirmed Demographics Correct on Facesheet     Source of Information family     When was your last doctors appointment? --   Ms. Pantoja, Spouse stated he see's his pcp at [facility]    Does patient/caregiver understand observation status --   inpatient status    Communicated CHRISTOPHER with patient/caregiver Date not available/Unable to determine     Reason For Admission Closed fracture of right hip     Lives With other (see comments)   patient lives at [facility] at  [place]    Facility Arrived From: Patient lives at [facility]     Do you expect to return to your current living situation? Yes     Do you have help at home or someone to help you manage your care at home? Yes     Who are your caregiver(s) and their phone number(s)? Staff at [facility]     Prior to hospitilization cognitive status: Alert/Oriented     Current cognitive status: Not Oriented to Time     Walking or Climbing Stairs Difficulty ambulation difficulty, assistance 1 person     Mobility Management Rollator     Dressing/Bathing Difficulty bathing difficulty, assistance 1 person;dressing difficulty, dependent     Dressing/Bathing Management patient lives at [facility]     Do you have any problems with: --   patient lives at [facility]    Home Accessibility wheelchair accessible     Home Layout Able to live on 1st floor     Equipment Currently Used at Home rollator;wheelchair     Readmission within 30 days? No     Patient currently being followed by outpatient case management? No     Do you currently have service(s) that help you manage your care at home? --   patient malik aguilar [facility] and is on Hospice    Do you take prescription medications? Yes     Do you have prescription coverage? Yes     Coverage Medicare     Do you have any problems affording any of your prescribed medications? No     Is the patient taking medications as prescribed? yes     Who is going to help you get home at discharge? patient lives at [facility]     How do you get to doctors appointments? other (see comments)   PCP see patient at [facility]    Are you on dialysis? No     Do you take coumadin? No     Discharge Plan A Other   [facility]    Discharge Plan B Other   [facility]    DME Needed Upon Discharge  other (see comments)   TBD    Discharge Plan discussed with: Spouse/sig other     Name(s) and Number(s) Eleanor Pantoja  [phone]     Discharge Barriers Identified None     SDOH --   None

## 2022-01-10 NOTE — PT/OT/SLP EVAL
"Speech Language Pathology Evaluation  Bedside Swallow    Patient Name:  Jonathon Pantoja   MRN:  [id number]  Admitting Diagnosis: Closed fracture of right hip    Recommendations:                 General Recommendations:  SLP to follow for dysphagia; consider alternative means of long term nutritional support; may consider further assessment via MBSS if pt is able to participate in exam  Diet recommendations:  NPO, NPO   Aspiration Precautions: Ice chips sparingly, sit upright for any PO trials/intake   General Precautions: Standard, aspiration,fall,NPO  Communication strategies:  pt has history of Parkinson's Disease and Dementia; minimal verbalizations    History:     Past Medical History:   Diagnosis Date    Abnormal fasting glucose     Hyperlipidemia     Hypertension     Parkinson's disease        Past Surgical History:   Procedure Laterality Date    INGUINAL HERNIA REPAIR Bilateral      HPI per EMR: "History was obtained from the family present at the bedside and ER physician Sign-out.  Patient is a 76-year-old male with a history of hypertension, hyperlipidemia, Parkinson's disease, dementia who presents to the ED after fall.  He is a resident at the [facility].  He had a unwitnessed fall and found on the ground.  It is unknown if he had loss of consciousness.  Per the family at the bedside the patient is currently at his mental baseline.  The daughter reports the patient has days where he communicates with them and other days he is nonverbal.  He is usually oriented to self.  He reported pain to his right knee, and bruising noted to the right leg."    Social History: Patient lives at [facility].    Prior Intubation HX:  None this admission    Modified Barium Swallow: None this admission    Imaging Results          CT Pelvis Without Contrast (Final result)  Result time 01/09/22 00:33:56    Final result by Steve Palma MD (01/09/22 00:33:56)                 Narrative:    CT PELVIS " WITHOUT CONTRAST  RPID: CT PELVIS WITHOUT IV CONTRAST    CLINICAL INDICATION: 76 years old Male with a history of Pelvic trauma  COMPARISON: None  TECHNIQUE: Axial CT of the pelvis was conducted without IV contrast. From this data, sagittal and coronal reformatted images were generated.  RADIATION DOSE REDUCTION: This exam was performed according to our departmental dose-optimization program, which includes automated exposure control, adjustment of the mA and kV according to patient size, and the use of iterative reconstruction technique if available.    FINDINGS:    There is a transverse fracture through the greater trochanter of the right femur. This is approximately 3 x 1.6 x 1.6 cm. Approximately 5 mm separation of the fracture fragments is observed.    IMPRESSION:  There is a transverse fracture of the greater trochanter of the right femur. No additional fractures are seen.      Electronically signed by:  Steve Palma MD  1/9/2022 12:33 AM CST Workstation: [id number]                             CT Cervical Spine Without Contrast (Final result)  Result time 01/09/22 00:26:50    Final result by Steve Palma MD (01/09/22 00:26:50)                 Narrative:    CT CERVICAL SPINE WITHOUT CONTRAST  RPID: CT CERVICAL SPINE WITHOUT IV CONTRAST    HISTORY: 76 years old Male with Neck trauma (Age >= 65y),    COMPARISON: None.    TECHNIQUE: CT scan of the cervical spine was performed without  IV contrast.  RADIATION DOSE REDUCTION: This exam was performed according to our departmental dose-optimization program, which includes automated exposure control, adjustment of the mA and kV according to patient size, and the use of iterative reconstruction technique if available.    FINDINGS:  There are degenerative changes in the cervical spine, particularly at the C5-6 disc space level. There is no fracture. The craniocervical junction shows mild degenerative changes. The prevertebral soft tissues appear  normal.      IMPRESSION:  Degenerative changes are observed, there is no fracture seen.    Electronically signed by:  Steve Palma MD  1/9/2022 12:26 AM CST Workstation: [id number]                             CT Head Without Contrast (Final result)  Result time 01/09/22 00:28:26    Final result by Nazario Steve DO (01/09/22 00:28:26)                 Narrative:    CLINICAL HISTORY: Head trauma and pain    COMPARISON: None    TECHNIQUE: Noncontrast axial CT of the brain was performed. Coronal and sagittal reformatted images were obtained. This exam was performed according to our departmental dose-optimization program, which includes automated exposure control, adjustment of the mA and/or kV according to patient size, and/or use of iterative reconstruction technique.    FINDINGS:  There is no midline shift, mass effect, or abnormal extra-axial fluid collection. There is no evidence of acute intracranial hemorrhage, mass, or cerebral edema. There are hypodense foci in the basal ganglia bilaterally suggestive of chronic lacunar infarctions. Mild scattered periventricular and deep white matter hypodensities, likely chronic microvascular ischemic disease. The gray-white matter junction is preserved. Moderate prominence of the lateral ventricles and cortical sulci suggestive of diffuse cerebral volume loss, age-appropriate. Scattered vascular calcifications. The visualized paranasal sinuses and mastoid air cells are clear. The calvarium is intact.    IMPRESSION:  1. No acute intracranial process.    2. Mild chronic microvascular ischemic disease and chronic lacunar infarctions of the basal ganglia bilaterally.    Electronically signed by:  Nazario Steve DO  1/9/2022 12:28 AM CST Workstation: [id number]                             X-Ray Knee Complete 4 or more Views Right (Final result)  Result time 01/09/22 00:27:54   Procedure changed from X-Ray Knee 3 View Right     Final result by Steve Palma MD (01/09/22 00:27:54)                  Narrative:    XR KNEE COMP 4 OR MORE VIEWS RIGHT  RPID: XR KNEE 4 OR MORE VIEWS    CLINICAL HISTORY:  76 years  old Male,   trauma    COMPARISON:  None  VIEW COUNT: 4  FINDINGS:  Chondrocalcinosis of the lateral compartment is observed. There are vascular calcifications. There is no evidence of an acute fracture seen. There are mild degenerative osteoarthritic changes.    IMPRESSION:  There are mild degenerative changes, no evidence of a fracture seen.    Electronically signed by:  Steve Palma MD  1/9/2022 12:27 AM CST Workstation: [id number]                             X-Ray Hip with Pelvis when performed, 2 or 3 views Right (Final result)  Result time 01/09/22 00:30:57    Final result by Steve Palma MD (01/09/22 00:30:57)                 Narrative:    XR HIP WITH PELVIS WHEN PERFORMED, 2 OR 3  VIEWS RIGHT  RPID: Right hip and pelvis    CLINICAL HISTORY:  76 years old Male,  COMPARISON:  None    Views: 3    FINDINGS:  A transverse fracture through the greater trochanter is observed. Slight separation of fracture fragments is noted. There are mild degenerative changes of both hips.      IMPRESSION:  There is a fracture the greater trochanter of the right hip observed, the femoral neck and head appear unremarkable.    Electronically signed by:  Steve Palma MD  1/9/2022 12:30 AM CST Workstation: [id number]                             X-Ray Chest AP Portable (Final result)  Result time 01/09/22 00:35:06    Final result by Steve Palma MD (01/09/22 00:35:06)                 Narrative:    Of XR CHEST AP PORTABLE  RPID: XR CHEST 1 VIEW    CLINICAL HISTORY:  76 years old Male with fall Pain to right hip and right knee after fall    COMPARISON:  9/20/2018  Number of views: 1  FINDINGS:  The cardiomediastinal silhouette is unchanged. There is no focal acute infiltrate is seen. No pleural fluid is observed.    A c-collar is present.    IMPRESSION:  No acute cardiothoracic process is observed.  Her    Electronically signed by:  Steve Palma MD  1/9/2022 12:35 AM CST Workstation: [id number]                              Prior diet: unable to confirm/obtain.    Subjective     Pt is awake, reduced levels of alertness. Eyes remained closed throughout most of session. Pt was cooperative with assessment.    Patient goals: unable to state d/t cognitive deficits     Pain/Comfort:  · Pain Rating 1: 0/10    Respiratory Status: Room air    Objective:     Oral Musculature Evaluation  · Dentition: scattered dentition,teeth in poor condition  · Secretion Management: left corner drooling  · Mucosal Quality: adequate  · Oral Labial Strength and Mobility: other (see comments) (unable to assess d/t cognitive deficits)  · Lingual Strength and Mobility: impaired left lateral movement,impaired right lateral movement,other (see comments) (impaired strength and coordination)  · Velar Elevation: other (see comments) (unable to visualize on exam)  · Volitional Cough: productive but overall weak  · Volitional Swallow: delayed response; multiple swallows per presentation; hyolaryngeal elevation present upon digital palpation  · Voice Prior to PO Intake: unable to elicit verbalizations prior to or immediately following PO trials    Bedside Swallow Eval:   Consistencies Assessed:  · Thin liquid: ice chip x2, 5ml x1, 10ml x1  · Puree: 1/4tsp x1  · Solid: deferred    Oral Phase:   · Intact labial seal to cup edge and for spoon stripping   · Adequate oral containment without anterior spillage across consistencies   · No evidence of appreciable oral residue    Pharyngeal Phase:   · Coughing/choking evident with larger volumes (10ml)  · Signs of airway threat and risk of aspiration is apparent   · Swallow response was delayed with all presentations  · Multiple swallows/laryngeal pumping motion were observed across consistencies trialled     Compensatory Strategies  · None     Cognitive/Communication:  Pt has history of dementia with minimal  verbalizations and resides in a Memory Care facility. This session he was oriented to name only. He followed simple/basic commands for the purpose of this bedside swallow assessment. Cognitive/linguistic deficits are apparent.      Assessment:     Jonathon Pantoja is a 76 y.o. male with an SLP diagnosis of Dysphagia and Cognitive-Linguistic Impairment.  He presents with signs of aspiration including coughing/choking on thin liquids and remains at high risk. Uncertain whether his cognitive deficits would inhibit participation in MBSS for further evaluation. Will continue to assess bedside and follow with above recommendations.    Goals:   Multidisciplinary Problems     SLP Goals        Problem: SLP Goal    Goal Priority Disciplines Outcome   SLP Goal     SLP    Description: 1. Pt to tolerate PO trials without signs/symptoms of aspiration or airway threat  2. Pt to participate in ongoing assessment of swallowing  3. Pt to participate in oral hygiene x3/day to inhibit decolonization of bacteria and pulmonary related complications                   Plan:     · Patient to be seen:  3 x/week   · Plan of Care expires:     · Plan of Care reviewed with:  patient,other (see comments) (RN)   · SLP Follow-Up:  Yes       Discharge recommendations:  nursing facility, skilled   Barriers to Discharge:  pending medical course    Time Tracking:     SLP Treatment Date:   01/10/22  Speech Start Time:  1006  Speech Stop Time:  1016     Speech Total Time (min):  10 min    Billable Minutes: Eval Swallow and Oral Function 10    01/10/2022

## 2022-01-11 PROBLEM — Z51.5 HOSPICE CARE: Status: ACTIVE | Noted: 2022-01-11

## 2022-01-11 LAB
ANION GAP SERPL CALC-SCNC: 5 MMOL/L (ref 8–16)
BASOPHILS # BLD AUTO: 0.06 K/UL (ref 0–0.2)
BASOPHILS NFR BLD: 0.7 % (ref 0–1.9)
BUN SERPL-MCNC: 14 MG/DL (ref 8–23)
CALCIUM SERPL-MCNC: 8.1 MG/DL (ref 8.7–10.5)
CHLORIDE SERPL-SCNC: 105 MMOL/L (ref 95–110)
CO2 SERPL-SCNC: 26 MMOL/L (ref 23–29)
CREAT SERPL-MCNC: 0.5 MG/DL (ref 0.5–1.4)
DIFFERENTIAL METHOD: ABNORMAL
EOSINOPHIL # BLD AUTO: 0.1 K/UL (ref 0–0.5)
EOSINOPHIL NFR BLD: 0.7 % (ref 0–8)
ERYTHROCYTE [DISTWIDTH] IN BLOOD BY AUTOMATED COUNT: 12.9 % (ref 11.5–14.5)
EST. GFR  (AFRICAN AMERICAN): >60 ML/MIN/1.73 M^2
EST. GFR  (NON AFRICAN AMERICAN): >60 ML/MIN/1.73 M^2
GLUCOSE SERPL-MCNC: 131 MG/DL (ref 70–110)
HCT VFR BLD AUTO: 33.8 % (ref 40–54)
HGB BLD-MCNC: 11.9 G/DL (ref 14–18)
IMM GRANULOCYTES # BLD AUTO: 0.03 K/UL (ref 0–0.04)
IMM GRANULOCYTES NFR BLD AUTO: 0.4 % (ref 0–0.5)
LYMPHOCYTES # BLD AUTO: 1.3 K/UL (ref 1–4.8)
LYMPHOCYTES NFR BLD: 15.9 % (ref 18–48)
MAGNESIUM SERPL-MCNC: 1.9 MG/DL (ref 1.6–2.6)
MCH RBC QN AUTO: 32.1 PG (ref 27–31)
MCHC RBC AUTO-ENTMCNC: 35.2 G/DL (ref 32–36)
MCV RBC AUTO: 91 FL (ref 82–98)
MONOCYTES # BLD AUTO: 0.6 K/UL (ref 0.3–1)
MONOCYTES NFR BLD: 7.8 % (ref 4–15)
NEUTROPHILS # BLD AUTO: 6.1 K/UL (ref 1.8–7.7)
NEUTROPHILS NFR BLD: 74.5 % (ref 38–73)
NRBC BLD-RTO: 0 /100 WBC
PLATELET # BLD AUTO: 277 K/UL (ref 150–450)
PMV BLD AUTO: 10.9 FL (ref 9.2–12.9)
POTASSIUM SERPL-SCNC: 3.6 MMOL/L (ref 3.5–5.1)
RBC # BLD AUTO: 3.71 M/UL (ref 4.6–6.2)
SODIUM SERPL-SCNC: 136 MMOL/L (ref 136–145)
WBC # BLD AUTO: 8.18 K/UL (ref 3.9–12.7)

## 2022-01-11 PROCEDURE — 25000003 PHARM REV CODE 250: Performed by: INTERNAL MEDICINE

## 2022-01-11 PROCEDURE — 97530 THERAPEUTIC ACTIVITIES: CPT

## 2022-01-11 PROCEDURE — 83735 ASSAY OF MAGNESIUM: CPT | Performed by: NURSE PRACTITIONER

## 2022-01-11 PROCEDURE — 21400001 HC TELEMETRY ROOM

## 2022-01-11 PROCEDURE — 25000003 PHARM REV CODE 250: Performed by: NURSE PRACTITIONER

## 2022-01-11 PROCEDURE — 85025 COMPLETE CBC W/AUTO DIFF WBC: CPT | Performed by: NURSE PRACTITIONER

## 2022-01-11 PROCEDURE — 80048 BASIC METABOLIC PNL TOTAL CA: CPT | Performed by: NURSE PRACTITIONER

## 2022-01-11 PROCEDURE — 92526 ORAL FUNCTION THERAPY: CPT

## 2022-01-11 PROCEDURE — 36415 COLL VENOUS BLD VENIPUNCTURE: CPT | Performed by: NURSE PRACTITIONER

## 2022-01-11 RX ADMIN — MELATONIN 9 MG: at 08:01

## 2022-01-11 RX ADMIN — CARBIDOPA AND LEVODOPA 1 TABLET: 25; 250 TABLET ORAL at 08:01

## 2022-01-11 RX ADMIN — AMLODIPINE BESYLATE 5 MG: 5 TABLET ORAL at 08:01

## 2022-01-11 RX ADMIN — LEUCINE, PHENYLALANINE, LYSINE, METHIONINE, ISOLEUCINE, VALINE, HISTIDINE, THREONINE, TRYPTOPHAN, ALANINE, GLYCINE, ARGININE, PROLINE, SERINE, TYROSINE, DEXTROSE 2000 ML: 311; 238; 247; 170; 255; 247; 204; 179; 77; 880; 438; 489; 289; 213; 17; 5 INJECTION INTRAVENOUS at 02:01

## 2022-01-11 RX ADMIN — CARBIDOPA AND LEVODOPA 1 TABLET: 25; 250 TABLET ORAL at 06:01

## 2022-01-11 NOTE — PLAN OF CARE
Problem: Physical Therapy Goal  Goal: Physical Therapy Goal  Description: Goals to be met by: 02/10/22     Patient will increase functional independence with mobility by performin. Supine to sit with modA  2. Sit to supine with mod A  3. Sit to stand transfer with mod A  4. Bed to chair transfer with mod A using No Assistive Device  5. Gait  x 50 feet with Minimal Assistance using least restrictive AD    2022 1743 by Josephine Espino PT  Outcome: Unable to Meet, Plan Revised

## 2022-01-11 NOTE — PROGRESS NOTES
[facility] Medicine  Progress Note    Patient Name: Jonathon Pantoja  MRN: [id number]  Patient Class: IP- Inpatient   Admission Date: 1/8/2022  Length of Stay: 1 days  Attending Physician: Peter Mendez MD  Primary Care Provider: Nan Mo NP        Subjective:     Principal Problem:Closed fracture of right hip    Interval History:     Patient is seen and examined   He is very drowsy and not able to answer the questions . RN called to the nursing home and appear his baseline .  Ortho surgeon reviewed and conservative Mx.    1/10  Still drolling saliva  Speech want to keep NPO until tomorrow and do modified barum study   D/w wife in detal . Patient was on hospice . Want to go back at DC . Now he need PT long term . D/w Case Mx and looking into it .    Review of Systems  Objective:     Vital Signs (Most Recent):  Temp: 98.5 °F (36.9 °C) (01/10/22 1637)  Pulse: 77 (01/10/22 1637)  Resp: 18 (01/10/22 1637)  BP: (!) 160/78 (01/10/22 1637)  SpO2: 96 % (01/10/22 1637) Vital Signs (24h Range):  Temp:  [98 °F (36.7 °C)-98.5 °F (36.9 °C)] 98.5 °F (36.9 °C)  Pulse:  [] 77  Resp:  [16-20] 18  SpO2:  [91 %-98 %] 96 %  BP: (147-168)/(67-83) 160/78     Weight: 54.8 kg (120 lb 13 oz)  Body mass index is 20.1 kg/m².    Intake/Output Summary (Last 24 hours) at 1/10/2022 1801  Last data filed at 1/10/2022 1300  Gross per 24 hour   Intake 240 ml   Output 400 ml   Net -160 ml      Physical Exam  General: appear not coherent   Eyes: EOM intact. No conjunctivae injection. No scleral icterus.  ENT: Hearing grossly intact. No discharge from ears. No nasal discharge.   CVS: RRR. No LE edema BL.  Lungs:  Good breath sounds. No accessory muscle use. No acute respiratory distress  Neuro: non focal , does  Not Follows commands.    Significant Labs:   All pertinent labs within the past 24 hours have been reviewed.  BMP:   Recent Labs   Lab 01/10/22  0459   *      K 3.8      CO2 24    BUN 10   CREATININE 0.5   CALCIUM 8.8   MG 2.0     CBC:   Recent Labs   Lab 01/08/22  2332 01/09/22  0843 01/10/22  0459   WBC 15.52* 11.74 12.50   HGB 11.7* 11.9* 12.5*   HCT 34.6* 35.5* 36.2*    273 280     CMP:   Recent Labs   Lab 01/08/22  2332 01/09/22  0843 01/10/22  0459    141 139   K 4.0 3.7 3.8    105 104   CO2 26 26 24   * 153* 119*   BUN 15 12 10   CREATININE 0.6 0.4* 0.5   CALCIUM 8.9 8.8 8.8   PROT 6.7  --   --    ALBUMIN 3.7  --   --    BILITOT 1.1*  --   --    ALKPHOS 74  --   --    AST 19  --   --    ALT 7*  --   --    ANIONGAP 10 10 11   EGFRNONAA >60.0 >60.0 >60.0     Cardiac Markers:   Recent Labs   Lab 01/08/22  2332   *       Significant Imaging: I have reviewed all pertinent imaging results/findings within the past 24 hours.    Assessment/Plan:      Active Diagnoses:    Diagnosis Date Noted POA    PRINCIPAL PROBLEM:  Closed fracture of right hip [S72.001A] 01/09/2022 Unknown    Parkinson's disease [G20] 05/01/2018 Yes    Hypertension [I10] 11/20/2017 Yes      Problems Resolved During this Admission:     ASSESSMENT AND PLAN       # Acute transverse fracture of the greater trochanter of the right femur secondary to fall      Chronic problems  Hypertension  Hyperlipidemia  Parkinson's           Plan  Conservative Mx as per ortho surgeon   Start clinimix    may now need inpatient rehab/SNF and go back with hospice and PT at hospice as able   Continue home meds as ordered for chronic conditions    avoid drowsy medications   Barium study tomorrow   But wife stated that his condition is almost the same regarding swallowing   scopolamine patch to decrease secretions  Difficult case for placement   Downgrade to 1100      VTE Risk Mitigation (From admission, onward)         Ordered     IP VTE HIGH RISK PATIENT  Once         01/09/22 0344     Place sequential compression device  Until discontinued         01/09/22 0344     Place MENDEZ hose  Until discontinued          01/09/22 0344                   Peter Mendez MD  Department of Hospital Medicine   [facility]

## 2022-01-11 NOTE — PT/OT/SLP PROGRESS
Speech Language Pathology      Jonathon Pantoja  MRN: [id number]    Contacted by RN regarding Pt's ability to initiate oral diet. MD reporting Pt to d/c back to hospice and requesting diet order and route for oral medications. Based off review of treating clinician's notes, puree solids with thin liquid via teaspoon and medications crushed in applesauce or pudding would be recommended. Recommendations discussed w/ RN.

## 2022-01-11 NOTE — PT/OT/SLP PROGRESS
Speech Language Pathology Treatment    Patient Name:  Jonathon Pantoja   MRN:  [id number]  Admitting Diagnosis: Closed fracture of right hip    Recommendations:                 General Recommendations:  ongoing assessment of swallow; modified barium swallow (clinician spoke with xray, who indicated study cannot be scheduled until tomorrow); consider dietitian consult if not already on board    Diet recommendations/Aspiration precautions:  NPO, Liquid Diet Level: NPO   Except ice chips (single chips) with nursing in small bolus size or comfort, with head of bed raised to 70-80 degrees, if tolerated, with pt fully pulled up in bed.  Oral care to reduce risk of aspiration pneumonia (swabs or suction brush)    General Precautions: Standard, aspiration,fall,NPO  Communication strategies: reduce distractions in room, explain purpose of visit    Subjective   Minimal speech.  Patient goals: Pt not able to state goals     Pain/Comfort:  ·  not able to report pain -- no grimace    Respiratory Status: Room air    Objective:     Has the patient been evaluated by SLP for swallowing?    yes  Keep patient NPO?   yes, except as discussed above.  Current Respiratory Status:    Room air    Pt seen after checking with nursing for ongoing swallow evaluation.  Pt repositioned with help from nursing.    Not following spoken instructions; not able to cough or swallow on command.  Minimal audible speech. Voice intensity poor/weak.  Oral cavity suctioned in lateral sulci prior to start of direct trials.    Ice -- small pieces x 5  Pt opened mouth readily for ice chips, and appeared to be enjoying the ice.  Upward laryngeal movement noted in less than 8 seconds.    Water by tsp x 3  No overt sign of aspiration -- however multiple swallows noted    Water by straw x 2 with straw pinched by clinician to regulate flow  One delayed cough after second sip.  Signs consistent with incomplete swallows or re-swallowing/repetitive swallowing.    Apple  sauce x 2 half-tsps  No cough or overt sign of aspiration but signs consistent with incomplete swallows/repetitive swallows (potential pharyngeal dysphagia)    Assessment:     Jonathon Pantoja is a 76 y.o. male with an SLP diagnosis of Dysphagia.  He has hx of dementia and Parkinson's.   He is currently in hospital with hip fracture.  He tolerated single ice chips without overt sign of aspiration.  He demonstrated reswallowing/repetitive swallows with liquid and puree.   Aspiration/pharyngeal dysphagia cannot be ruled out at bedside.  If family/MD would like more detailed assessment and to assess further for aspiration, clinician would recommend assessment via modified barium swallow study.  Recommendations/findings were messaged to nursing/MD.    Goals:   Multidisciplinary Problems     SLP Goals        Problem: SLP Goal    Goal Priority Disciplines Outcome   SLP Goal     SLP    Description: 1. Pt to tolerate PO trials without signs/symptoms of aspiration or airway threat  2. Pt to participate in ongoing assessment of swallowing  3. Pt to participate in oral hygiene x3/day to inhibit decolonization of bacteria and pulmonary related complications                   Plan:     · Patient to be seen:  3 x/week   · Plan of Care expires:     · Plan of Care reviewed with:  patient,other (see comments) (RN)   · SLP Follow-Up:  Yes       Discharge recommendations:  nursing facility, skilled   Barriers to Discharge:  to be determined    Time Tracking:     SLP Treatment Date:   01/11/22  Speech Start Time:  1140  Speech Stop Time:  1216     Speech Total Time (min):  36 min    Billable Minutes: Treatment Swallowing Dysfunction 36    01/11/2022

## 2022-01-11 NOTE — PT/OT/SLP PROGRESS
Physical Therapy Treatment    Patient Name:  Jonathon Pantoja   MRN:  [id number]    Recommendations:     Discharge Recommendations:  nursing facility, skilled   Discharge Equipment Recommendations: none   Barriers to discharge: None    Assessment:     Jonathon Pantoja is a 76 y.o. male admitted with a medical diagnosis of Closed fracture of right hip.  He presents with the following impairments/functional limitations:  impaired endurance,weakness,impaired self care skills,impaired functional mobilty,gait instability,impaired balance,impaired cognition,decreased coordination,decreased safety awareness,abnormal tone,impaired joint extensibility,impaired muscle length.  Patient with difficulty following commands. Able to sit edge of bed and stand with max to mod A    Rehab Prognosis: Fair; patient would benefit from acute skilled PT services to address these deficits and reach maximum level of function.    Recent Surgery: * No surgery found *      Plan:     During this hospitalization, patient to be seen 5 x/week to address the identified rehab impairments via gait training,therapeutic activities,therapeutic exercises and progress toward the following goals:    · Plan of Care Expires:  02/10/22    Subjective     Chief Complaint: wife states she wants him to go to [place] with home health  Patient/Family Comments/goals: get better        Objective:     Communicated with nurse prior to session.  Patient found supine with telemetry,peripheral IV upon PT entry to room.     General Precautions: Standard, fall,aspiration   Orthopedic Precautions:RLE weight bearing as tolerated   Braces: N/A  Respiratory Status: Room air     Functional Mobility:  · Bed Mobility:     · Rolling Left:  maximal assistance  · Scooting: maximal assistance and of 2 persons  · Supine to Sit: maximal assistance  · Sit to Supine: maximal assistance  · Transfers:     · Sit to Stand:  maximal assistance and of 2 persons with rolling walker  Required  hand over hand assistance to grab walker    AM-PAC 6 CLICK MOBILITY  Turning over in bed (including adjusting bedclothes, sheets and blankets)?: 2  Sitting down on and standing up from a chair with arms (e.g., wheelchair, bedside commode, etc.): 2  Moving from lying on back to sitting on the side of the bed?: 2  Moving to and from a bed to a chair (including a wheelchair)?: 2  Need to walk in hospital room?: 2  Climbing 3-5 steps with a railing?: 1  Basic Mobility Total Score: 11       Therapeutic Activities and Exercises:   sat edge of bed x 10 min with mod A progressing to min A. Discussed DC plan with wife    Patient left supine with all lines intact, call button in reach and wife present..    GOALS:   Multidisciplinary Problems     Physical Therapy Goals        Problem: Physical Therapy Goal    Goal Priority Disciplines Outcome Goal Variances Interventions   Physical Therapy Goal     PT, PT/OT Unable to Meet, Plan Revised     Description: Goals to be met by: 02/10/22     Patient will increase functional independence with mobility by performin. Supine to sit with Stand-by Assistance  2. Sit to supine with Stand-by Assistance  3. Sit to stand transfer with Contact Guard Assistance  4. Bed to chair transfer with Contact Guard Assistance using No Assistive Device  5. Gait  x 50 feet with Minimal Assistance using least restrictive AD                     Time Tracking:     PT Received On: 22  PT Start Time: 1445     PT Stop Time: 1505  PT Total Time (min): 20 min     Billable Minutes: Therapeutic Activity 20 min       PT/PTA: PT     PTA Visit Number: 0     2022

## 2022-01-12 VITALS
HEIGHT: 65 IN | WEIGHT: 117.94 LBS | TEMPERATURE: 98 F | BODY MASS INDEX: 19.65 KG/M2 | HEART RATE: 68 BPM | SYSTOLIC BLOOD PRESSURE: 163 MMHG | RESPIRATION RATE: 18 BRPM | OXYGEN SATURATION: 100 % | DIASTOLIC BLOOD PRESSURE: 71 MMHG

## 2022-01-12 PROBLEM — S72.001A CLOSED FRACTURE OF RIGHT HIP: Status: RESOLVED | Noted: 2022-01-09 | Resolved: 2022-01-12

## 2022-01-12 LAB
ANION GAP SERPL CALC-SCNC: 10 MMOL/L (ref 8–16)
BACTERIA UR CULT: NO GROWTH
BASOPHILS # BLD AUTO: 0.07 K/UL (ref 0–0.2)
BASOPHILS NFR BLD: 0.8 % (ref 0–1.9)
BUN SERPL-MCNC: 16 MG/DL (ref 8–23)
CALCIUM SERPL-MCNC: 8.8 MG/DL (ref 8.7–10.5)
CHLORIDE SERPL-SCNC: 105 MMOL/L (ref 95–110)
CO2 SERPL-SCNC: 23 MMOL/L (ref 23–29)
CREAT SERPL-MCNC: 0.5 MG/DL (ref 0.5–1.4)
DIFFERENTIAL METHOD: ABNORMAL
EOSINOPHIL # BLD AUTO: 0.1 K/UL (ref 0–0.5)
EOSINOPHIL NFR BLD: 1.2 % (ref 0–8)
ERYTHROCYTE [DISTWIDTH] IN BLOOD BY AUTOMATED COUNT: 12.9 % (ref 11.5–14.5)
EST. GFR  (AFRICAN AMERICAN): >60 ML/MIN/1.73 M^2
EST. GFR  (NON AFRICAN AMERICAN): >60 ML/MIN/1.73 M^2
GLUCOSE SERPL-MCNC: 102 MG/DL (ref 70–110)
HCT VFR BLD AUTO: 35.8 % (ref 40–54)
HGB BLD-MCNC: 12.5 G/DL (ref 14–18)
IMM GRANULOCYTES # BLD AUTO: 0.03 K/UL (ref 0–0.04)
IMM GRANULOCYTES NFR BLD AUTO: 0.3 % (ref 0–0.5)
LYMPHOCYTES # BLD AUTO: 1.6 K/UL (ref 1–4.8)
LYMPHOCYTES NFR BLD: 17.8 % (ref 18–48)
MAGNESIUM SERPL-MCNC: 2 MG/DL (ref 1.6–2.6)
MCH RBC QN AUTO: 32 PG (ref 27–31)
MCHC RBC AUTO-ENTMCNC: 34.9 G/DL (ref 32–36)
MCV RBC AUTO: 92 FL (ref 82–98)
MONOCYTES # BLD AUTO: 0.8 K/UL (ref 0.3–1)
MONOCYTES NFR BLD: 8.2 % (ref 4–15)
NEUTROPHILS # BLD AUTO: 6.6 K/UL (ref 1.8–7.7)
NEUTROPHILS NFR BLD: 71.7 % (ref 38–73)
NRBC BLD-RTO: 0 /100 WBC
PLATELET # BLD AUTO: 296 K/UL (ref 150–450)
PMV BLD AUTO: 10.9 FL (ref 9.2–12.9)
POTASSIUM SERPL-SCNC: 3.7 MMOL/L (ref 3.5–5.1)
RBC # BLD AUTO: 3.91 M/UL (ref 4.6–6.2)
SARS-COV-2 RDRP RESP QL NAA+PROBE: NEGATIVE
SODIUM SERPL-SCNC: 138 MMOL/L (ref 136–145)
WBC # BLD AUTO: 9.23 K/UL (ref 3.9–12.7)

## 2022-01-12 PROCEDURE — 25000003 PHARM REV CODE 250: Performed by: NURSE PRACTITIONER

## 2022-01-12 PROCEDURE — 97116 GAIT TRAINING THERAPY: CPT | Mod: CQ

## 2022-01-12 PROCEDURE — 36415 COLL VENOUS BLD VENIPUNCTURE: CPT | Performed by: NURSE PRACTITIONER

## 2022-01-12 PROCEDURE — 80048 BASIC METABOLIC PNL TOTAL CA: CPT | Performed by: NURSE PRACTITIONER

## 2022-01-12 PROCEDURE — U0002 COVID-19 LAB TEST NON-CDC: HCPCS | Performed by: INTERNAL MEDICINE

## 2022-01-12 PROCEDURE — 92526 ORAL FUNCTION THERAPY: CPT

## 2022-01-12 PROCEDURE — 83735 ASSAY OF MAGNESIUM: CPT | Performed by: NURSE PRACTITIONER

## 2022-01-12 PROCEDURE — 85025 COMPLETE CBC W/AUTO DIFF WBC: CPT | Performed by: NURSE PRACTITIONER

## 2022-01-12 RX ADMIN — CARBIDOPA AND LEVODOPA 1 TABLET: 25; 250 TABLET ORAL at 05:01

## 2022-01-12 RX ADMIN — LOSARTAN POTASSIUM 100 MG: 50 TABLET, FILM COATED ORAL at 08:01

## 2022-01-12 RX ADMIN — CARBIDOPA AND LEVODOPA 1 TABLET: 25; 250 TABLET ORAL at 08:01

## 2022-01-12 RX ADMIN — THERA TABS 1 TABLET: TAB at 08:01

## 2022-01-12 RX ADMIN — CARBIDOPA AND LEVODOPA 1 TABLET: 25; 250 TABLET ORAL at 01:01

## 2022-01-12 RX ADMIN — ATENOLOL 50 MG: 50 TABLET ORAL at 08:01

## 2022-01-12 RX ADMIN — PRAVASTATIN SODIUM 40 MG: 40 TABLET ORAL at 08:01

## 2022-01-12 RX ADMIN — AMLODIPINE BESYLATE 5 MG: 5 TABLET ORAL at 08:01

## 2022-01-12 RX ADMIN — DONEPEZIL HYDROCHLORIDE 10 MG: 5 TABLET, FILM COATED ORAL at 08:01

## 2022-01-12 RX ADMIN — ASPIRIN 81 MG: 81 TABLET, DELAYED RELEASE ORAL at 08:01

## 2022-01-12 NOTE — PT/OT/SLP PROGRESS
"Speech Language Pathology Treatment    Patient Name:  Jonathon Pantoja   MRN:  [id number]  Admitting Diagnosis: Closed fracture of right hip    Recommendations:                 General Recommendations:  monitor swallow safety at next level of care; aspiration precautions     Diet:  Per MD notes, pt has end stage Parkinson's and is returning to hospice care today in an outside facility.  Given his diagnoses/plan to return to hospice care, aggressive swallowing intervention has been deferred.  Given cognitive impairment, Parkinson's, need for feeding by caregivers, clinician cannot recommend a particular diet consistency as fully safe for patient.  Least risky diet is likely to be moist puree with thin liquid.  However, pt is at risk for aspiration on any texture/consistency.      Aspiration Precautions: HOB to 70-90 degrees as tolerated given current closed right hip fracture; for any oral feeding, feed slowly; allow patient to swallow at least twice on each bolus.  Give breathing/rest breaks.  Suggest spoon size no larger than level tsp.    General Precautions: Standard, aspiration,fall    Subjective     "I think I need to be changed."  Patient goals: as above     Pain/Comfort:  ·  pt reported wanting to be changed.  He did not respond to questions regarding pain.    Respiratory Status: Room air    Objective:     Has the patient been evaluated by SLP for swallowing?   Yes  Pt has aspiration risk relating to mental status, endurance, swallow deficits relating to Parkinson's.  Current Respiratory Status:    Room air    Pt received a puree tray for lunch today.  Tray in room when clinician entered.  No food left on tray.  Based on amount of liquid left in milk container, it appears pt accepted about  1/3 to 2/3 of his milk.    Clinician offered education for patient regarding swallow safety precautions; no evidence of learning.  No family present.    Pt demonstrating dry voice quality when clinician entered room; " occasional spontaneous dry cough.  Mental status appears much improved from yesterday.    Pt followed instruction to protrude tongue and lateralize; he did not follow further oral motor instructions.  He accepted several ice chips and 3 tsp of water.   He demonstrated one swallow per bolus.  He demonstrated one small cough after final tsp liquid.    Pt then told clinician he needed to be changed.  Further intervention deferred at this time.  Nursing notified of pt request to be changed.    Assessment:     Jonathon Pantoja is a 76 y.o. male with hx of Parkinson's and an SLP diagnosis of Dysphagia.   Chart review reveals pt is returning to hospice at an outside facility.  He ate most of the puree tray he received today with staff assistance  He continues to have risk of aspiration due to multiple medical issues/cognitive impairment.      Goals:   Multidisciplinary Problems     SLP Goals        Problem: SLP Goal    Goal Priority Disciplines Outcome   SLP Goal     SLP    Description: 1. Pt to tolerate PO trials without signs/symptoms of aspiration or airway threat  2. Pt to participate in ongoing assessment of swallowing  3. Pt to participate in oral hygiene x3/day to inhibit decolonization of bacteria and pulmonary related complications                   Plan:     · Patient to be seen:   (reportedly returning to facility today with hospice services\)   · Plan of Care expires:     · Plan of Care reviewed with:  other (see comments) (will review with nursing/MD; recommend dietitian consult if not already on board)   · SLP Follow-Up:   (per MD notes, discharging to facility today)       Discharge recommendations:  nursing facility, skilled   Barriers to Discharge:  none known to clinician    Time Tracking:     SLP Treatment Date:   01/12/22  Speech Start Time:  1353  Speech Stop Time:  1401     Speech Total Time (min):  8 min    Billable Minutes: Treatment Swallowing Dysfunction 8    01/12/2022

## 2022-01-12 NOTE — ASSESSMENT & PLAN NOTE
Pt on Hospice care for past 7 months  All his present symptoms except fracture can be explained with end stage parkinsonism   Pt will be discharged tomorrow

## 2022-01-12 NOTE — PLAN OF CARE
Spoke with Emma from [facility] [place] in regards to receiving fax MD order per epic online, stated fax was received.

## 2022-01-12 NOTE — PLAN OF CARE
Spoke with CHARLOTTE Patton at [facility].  Pooja asked for discharge orders and summary notes faxed to [phone].    Faxed Discharged orders and summary note to CHARLOTTE Patton at [facility].

## 2022-01-12 NOTE — PLAN OF CARE
Problem: Adult Inpatient Plan of Care  Goal: Plan of Care Review  Outcome: Ongoing, Progressing  Goal: Patient-Specific Goal (Individualized)  Outcome: Ongoing, Progressing  Goal: Absence of Hospital-Acquired Illness or Injury  Outcome: Ongoing, Progressing  Goal: Optimal Comfort and Wellbeing  Outcome: Ongoing, Progressing  Goal: Readiness for Transition of Care  Outcome: Ongoing, Progressing     Problem: Skin Injury Risk Increased  Goal: Skin Health and Integrity  Outcome: Ongoing, Progressing     Problem: Fall Injury Risk  Goal: Absence of Fall and Fall-Related Injury  Outcome: Ongoing, Progressing

## 2022-01-12 NOTE — PLAN OF CARE
Problem: Adult Inpatient Plan of Care  Goal: Plan of Care Review  Outcome: Met  Goal: Patient-Specific Goal (Individualized)  Outcome: Met  Goal: Absence of Hospital-Acquired Illness or Injury  Outcome: Met  Goal: Optimal Comfort and Wellbeing  Outcome: Met  Goal: Readiness for Transition of Care  Outcome: Met     Problem: Skin Injury Risk Increased  Goal: Skin Health and Integrity  Outcome: Met     Problem: Fall Injury Risk  Goal: Absence of Fall and Fall-Related Injury  Outcome: Met

## 2022-01-12 NOTE — PLAN OF CARE
Faxed resume Hospice orders per [facility] online to [facility] (formerly [facility]).    Spoke with Tanja at [facility] in regards to patient is being discharged today back to [facility] and resumption of care hospice orders to [facility]. Tanja stated ok will notify NP because patient will need a face to face assessment with  NP or MD and will see patient 01/12/2022 or 01/13/2022.

## 2022-01-12 NOTE — SUBJECTIVE & OBJECTIVE
Interval History:     Review of Systems   Unable to perform ROS: Mental status change     Objective:     Vital Signs (Most Recent):  Temp: 98.6 °F (37 °C) (01/11/22 1615)  Pulse: 74 (01/11/22 1615)  Resp: 20 (01/11/22 1615)  BP: 137/74 (01/11/22 1615)  SpO2: (!) 92 % (01/11/22 1615) Vital Signs (24h Range):  Temp:  [97.7 °F (36.5 °C)-98.6 °F (37 °C)] 98.6 °F (37 °C)  Pulse:  [65-74] 74  Resp:  [18-20] 20  SpO2:  [92 %-98 %] 92 %  BP: (137-175)/(73-80) 137/74     Weight: 54.8 kg (120 lb 13 oz)  Body mass index is 20.1 kg/m².    Intake/Output Summary (Last 24 hours) at 1/11/2022 2109  Last data filed at 1/11/2022 1416  Gross per 24 hour   Intake --   Output 950 ml   Net -950 ml      Physical Exam  Vitals and nursing note reviewed.   Constitutional:       General: He is not in acute distress.     Appearance: He is well-developed.   HENT:      Head: Atraumatic.      Nose: Nose normal.      Mouth/Throat:      Mouth: Mucous membranes are moist.   Eyes:      General: No scleral icterus.  Cardiovascular:      Rate and Rhythm: Normal rate.   Pulmonary:      Effort: Pulmonary effort is normal.   Abdominal:      General: There is no distension.   Musculoskeletal:         General: Normal range of motion.      Cervical back: Full passive range of motion without pain.   Skin:     General: Skin is warm.   Neurological:      Mental Status: Mental status is at baseline.         Significant Labs:   All pertinent labs within the past 24 hours have been reviewed.  CBC:   Recent Labs   Lab 01/10/22  0459 01/11/22  0413   WBC 12.50 8.18   HGB 12.5* 11.9*   HCT 36.2* 33.8*    277     CMP:   Recent Labs   Lab 01/10/22  0459 01/11/22  0413    136   K 3.8 3.6    105   CO2 24 26   * 131*   BUN 10 14   CREATININE 0.5 0.5   CALCIUM 8.8 8.1*   ANIONGAP 11 5*   EGFRNONAA >60.0 >60.0       Significant Imaging: I have reviewed all pertinent imaging results/findings within the past 24 hours.

## 2022-01-12 NOTE — PLAN OF CARE
Important Message from Medicare was sign, explained and given to patient/caregiver on 01/12/2022 at 9:10am    Patient returning to [facility] with Hospice     addressed any questions or concerns.    Important Message from Medicare document will be scanned into patient's medical record

## 2022-01-12 NOTE — PT/OT/SLP PROGRESS
Physical Therapy Treatment    Patient Name:  Jonathon Pantoja   MRN:  [id number]    Recommendations:     Discharge Recommendations:  nursing facility, skilled   Discharge Equipment Recommendations: none   Barriers to discharge: None    Assessment:     Jonathon Pantoja is a 76 y.o. male admitted with a medical diagnosis of Closed fracture of right hip.  He presents with the following impairments/functional limitations:  weakness,impaired endurance,impaired sensation,impaired self care skills,impaired functional mobilty,gait instability,impaired balance     Pt supine agree to tx. Bed mobility min assist Sit to stand min assist. Amb 50 ft r/w mod assist for balance. Pt very unsteady in walker . Dementia limiting pt safety and progression with mobility..    Rehab Prognosis: Good; patient would benefit from acute skilled PT services to address these deficits and reach maximum level of function.    Recent Surgery: * No surgery found *      Plan:     During this hospitalization, patient to be seen 5 x/week to address the identified rehab impairments via gait training,therapeutic activities,therapeutic exercises and progress toward the following goals:    · Plan of Care Expires:  02/10/22    Subjective     Chief Complaint: none  Patient/Family Comments/goals: none  Pain/Comfort:  ·        Objective:     Communicated with nsg prior to session.  Patient found supine with   upon PT entry to room.     General Precautions: Standard, fall,aspiration   Orthopedic Precautions:RLE weight bearing as tolerated   Braces:    Respiratory Status: Room air     Functional Mobility:  · Gait: Pt amb 50 ft r/w mod assist for safety and balance.      AM-PAC 6 CLICK MOBILITY          Therapeutic Activities and Exercises:   Bed mobility min assist.    Patient left supine with call button in reach..    GOALS:   Multidisciplinary Problems     Physical Therapy Goals        Problem: Physical Therapy Goal    Goal Priority Disciplines Outcome Goal  Variances Interventions   Physical Therapy Goal     PT, PT/OT Unable to Meet, Plan Revised     Description: Goals to be met by: 02/10/22     Patient will increase functional independence with mobility by performin. Supine to sit with Stand-by Assistance  2. Sit to supine with Stand-by Assistance  3. Sit to stand transfer with Contact Guard Assistance  4. Bed to chair transfer with Contact Guard Assistance using No Assistive Device  5. Gait  x 50 feet with Minimal Assistance using least restrictive AD                     Time Tracking:     PT Received On: 22  PT Start Time: 1143     PT Stop Time: 1158  PT Total Time (min): 15 min     Billable Minutes: Gait Training 15    Treatment Type: Treatment  PT/PTA: PTA     PTA Visit Number: 1     2022

## 2022-01-12 NOTE — PLAN OF CARE
01/12/22 1555   Final Note   Assessment Type Final Discharge Note   Anticipated Discharge Disposition HospiceHome   What phone number can be called within the next 1-3 days to see how you are doing after discharge? [phone]   Hospital Resources/Appts/Education Provided Post-Acute resouces added to AVS   Post-Acute Status   Post-Acute Authorization Hospice   Hospice Status Set-up Complete/Auth obtained   Discharge Delays (!) Ambulance Transport/Facility Transport       Discharged back to [facility] with [facility].

## 2022-01-12 NOTE — PLAN OF CARE
Spoke with Tere from [facility] in regards to set up transportation to [facility] at [address] [place] [address].  Informed Anige patient has a closed fracture of right hip and can only transport by ambulance.     Ambulance transportation set up with [facility], informed Nurse Radha, to call [facility] once patient is ready to be transported. Phone number to [facility] given to nurse Garcia.

## 2022-01-12 NOTE — HOSPITAL COURSE
Patient with Endstage Parkinson's/dementia and on Hospice care for last 7 months admitted with Closed fracture of right hip  Pt was evaluated by Ortho MD and Conservative management was advised  Later pt was discharged back to Facility with Hospice care

## 2022-01-12 NOTE — PROGRESS NOTES
[facility] Medicine  Progress Note    Patient Name: Jonathon Pantoja  MRN: [id number]  Patient Class: IP- Inpatient   Admission Date: 1/8/2022  Length of Stay: 2 days  Attending Physician: Cordell Jordan MD  Primary Care Provider: Nan Mo NP        Subjective:     Principal Problem:Closed fracture of right hip        HPI:  No notes on file    Overview/Hospital Course:  01/11  Pt back to baseline  Pts wife ok with discharge back to memory unit with HH       Interval History:     Review of Systems   Unable to perform ROS: Mental status change     Objective:     Vital Signs (Most Recent):  Temp: 98.6 °F (37 °C) (01/11/22 1615)  Pulse: 74 (01/11/22 1615)  Resp: 20 (01/11/22 1615)  BP: 137/74 (01/11/22 1615)  SpO2: (!) 92 % (01/11/22 1615) Vital Signs (24h Range):  Temp:  [97.7 °F (36.5 °C)-98.6 °F (37 °C)] 98.6 °F (37 °C)  Pulse:  [65-74] 74  Resp:  [18-20] 20  SpO2:  [92 %-98 %] 92 %  BP: (137-175)/(73-80) 137/74     Weight: 54.8 kg (120 lb 13 oz)  Body mass index is 20.1 kg/m².    Intake/Output Summary (Last 24 hours) at 1/11/2022 2109  Last data filed at 1/11/2022 1416  Gross per 24 hour   Intake --   Output 950 ml   Net -950 ml      Physical Exam  Vitals and nursing note reviewed.   Constitutional:       General: He is not in acute distress.     Appearance: He is well-developed.   HENT:      Head: Atraumatic.      Nose: Nose normal.      Mouth/Throat:      Mouth: Mucous membranes are moist.   Eyes:      General: No scleral icterus.  Cardiovascular:      Rate and Rhythm: Normal rate.   Pulmonary:      Effort: Pulmonary effort is normal.   Abdominal:      General: There is no distension.   Musculoskeletal:         General: Normal range of motion.      Cervical back: Full passive range of motion without pain.   Skin:     General: Skin is warm.   Neurological:      Mental Status: Mental status is at baseline.         Significant Labs:   All pertinent labs within the past 24 hours have been  reviewed.  CBC:   Recent Labs   Lab 01/10/22  0459 01/11/22  0413   WBC 12.50 8.18   HGB 12.5* 11.9*   HCT 36.2* 33.8*    277     CMP:   Recent Labs   Lab 01/10/22  0459 01/11/22  0413    136   K 3.8 3.6    105   CO2 24 26   * 131*   BUN 10 14   CREATININE 0.5 0.5   CALCIUM 8.8 8.1*   ANIONGAP 11 5*   EGFRNONAA >60.0 >60.0       Significant Imaging: I have reviewed all pertinent imaging results/findings within the past 24 hours.      Assessment/Plan:      * Closed fracture of right hip  Conservative management  HH PT/OT upon discharge      Hospice care  Pt on Hospice care for past 7 months  All his present symptoms except fracture can be explained with end stage parkinsonism   Pt will be discharged tomorrow       Parkinson's disease  On Hospice care       Hypertension  Stable         VTE Risk Mitigation (From admission, onward)         Ordered     IP VTE HIGH RISK PATIENT  Once         01/09/22 0344     Place sequential compression device  Until discontinued         01/09/22 0344     Place MENDEZ hose  Until discontinued         01/09/22 0344                Discharge Planning   CHRISTOPHER: 1/12/2022     Code Status: Full Code   Is the patient medically ready for discharge?:     Reason for patient still in hospital (select all that apply): Treatment  Discharge Plan A: Other (Yon[facility] at [place])                  Cordell Jordan MD  Department of Hospital Medicine   [facility]

## 2022-01-13 NOTE — NURSING
Patient transported via [facility] Vitals stable /63, HR 69, O2 98.  Wife Eleanor Pantoja notified of patients transport. Patient discharged with discharge papers and with patient belongings. Wife verbalized understanding of discharge and discharge instructions. Patient to be transported to [place], [place]

## 2022-01-13 NOTE — NURSING
Called report to Therese at [facility] Patient await EMS non emergent transportation.

## 2022-01-13 NOTE — DISCHARGE SUMMARY
[facility]  Discharge Summary      Patient Name: Jonathon Pantoja  MRN: [id number]  Patient Class: IP- Inpatient  Admission Date: 1/8/2022  Hospital Length of Stay: 3 days  Discharge Date and Time:  01/12/2022 8:06 PM  Attending Physician: Cordell Jordan MD   Discharging Provider: Cordell Jordan MD  Primary Care Provider: Nan Mo NP      HPI:   No notes on file    * No surgery found *      Hospital Course:   Patient with Endstage Parkinson's/dementia and on Hospice care for last 7 months admitted with Closed fracture of right hip  Pt was evaluated by Ortho MD and Conservative management was advised  Later pt was discharged back to Facility with Hospice care        Goals of Care Treatment Preferences:  Code Status: Full Code      Consults:   Consults (From admission, onward)        Status Ordering Provider     Inpatient consult to   Once        Provider:  (Not yet assigned)    Acknowledged CORDELL JORDAN     Case Management/  Once        Provider:  (Not yet assigned)    Acknowledged DUKE REYES     Inpatient consult to Internal Medicine  Once        Provider:  Duke Reyes NP    Acknowledged MEGAN URBAN     Inpatient consult to Orthopedic Surgery  Once        Provider:  Solomon Marie MD    Completed MEGAN URBAN          No new Assessment & Plan notes have been filed under this hospital service since the last note was generated.  Service: Hospital Medicine    Final Active Diagnoses:    Diagnosis Date Noted POA    Hospice care [Z51.5] 01/11/2022 Not Applicable    Parkinson's disease [G20] 05/01/2018 Yes    Hypertension [I10] 11/20/2017 Yes      Problems Resolved During this Admission:    Diagnosis Date Noted Date Resolved POA    PRINCIPAL PROBLEM:  Closed fracture of right hip [S72.001A] 01/09/2022 01/12/2022 Unknown       Discharged Condition: fair    Disposition: Hospice/Home    Follow Up:   Contact information for  after-discharge care     Home Medical Care     [facility] .    Service: Home Hospice  Contact information:  [address] [place] [id number] [phone]                           Patient Instructions:      Ambulatory referral/consult to Hospice   Standing Status: Future   Referral Priority: Routine Referral Type: Hospice   Referral Reason: Specialty Services Required   Requested Specialty: Hospice and Palliative Medicine   Number of Visits Requested: 1       Significant Diagnostic Studies: Labs:   CMP   Recent Labs   Lab 01/11/22 0413 01/12/22  0546    138   K 3.6 3.7    105   CO2 26 23   * 102   BUN 14 16   CREATININE 0.5 0.5   CALCIUM 8.1* 8.8   ANIONGAP 5* 10   ESTGFRAFRICA >60.0 >60.0   EGFRNONAA >60.0 >60.0    and CBC   Recent Labs   Lab 01/11/22 0413 01/11/22 0413 01/12/22  0546   WBC 8.18  --  9.23   HGB 11.9*  --  12.5*   HCT 33.8*   < > 35.8*     --  296    < > = values in this interval not displayed.       Pending Diagnostic Studies:     None         Medications:  Reconciled Home Medications:      Medication List      CONTINUE taking these medications    amLODIPine 5 MG tablet  Commonly known as: NORVASC  Take 1 tablet (5 mg total) by mouth 2 (two) times daily.     aspirin 81 MG EC tablet  Commonly known as: ECOTRIN  Take 1 tablet by mouth once daily.     atenoloL 50 MG tablet  Commonly known as: TENORMIN  Take 1 tablet (50 mg total) by mouth once daily.     carbidopa-levodopa  mg  mg per tablet  Commonly known as: SINEMET  Take 1 tablet by mouth 4 (four) times daily.     clonazePAM 0.5 MG tablet  Commonly known as: KlonoPIN  Take 1 tablet (0.5 mg total) by mouth 2 (two) times daily as needed for Anxiety.     donepeziL 10 MG tablet  Commonly known as: ARICEPT  Take 1 tablet by mouth once daily.     irbesartan 300 MG tablet  Commonly known as: AVAPRO  Take 1 tablet (300 mg total) by mouth every evening.     melatonin 10 mg Tab  Take 1  tablet by mouth every evening.     multivitamin per tablet  Commonly known as: THERAGRAN  Take 1 tablet by mouth once daily.     pravastatin 40 MG tablet  Commonly known as: PRAVACHOL  Take 1 tablet (40 mg total) by mouth once daily.        STOP taking these medications    NOURIANZ 20 mg Tab  Generic drug: istradefylline            Indwelling Lines/Drains at time of discharge:   Lines/Drains/Airways     None               Physical Exam   Cardiovascular: Normal rate.   Pulmonary/Chest: Effort normal.     Time spent on the discharge of patient: 50  minutes         Cordell Jordan MD  Department of Hospital Medicine  [facility]

## 2022-02-03 ENCOUNTER — TELEPHONE (OUTPATIENT)
Dept: FAMILY MEDICINE | Facility: CLINIC | Age: 77
End: 2022-02-03
Payer: MEDICARE

## 2022-02-03 NOTE — TELEPHONE ENCOUNTER
----- Message from Zoë London sent at 2/3/2022  9:14 AM CST -----  Patient wife called and stated that the patient passed away